# Patient Record
Sex: FEMALE | HISPANIC OR LATINO | Employment: FULL TIME | URBAN - METROPOLITAN AREA
[De-identification: names, ages, dates, MRNs, and addresses within clinical notes are randomized per-mention and may not be internally consistent; named-entity substitution may affect disease eponyms.]

---

## 2023-01-11 ENCOUNTER — OFFICE VISIT (OUTPATIENT)
Dept: OBSTETRICS AND GYNECOLOGY | Facility: CLINIC | Age: 23
End: 2023-01-11

## 2023-01-11 VITALS
HEIGHT: 64 IN | DIASTOLIC BLOOD PRESSURE: 64 MMHG | WEIGHT: 128 LBS | SYSTOLIC BLOOD PRESSURE: 108 MMHG | BODY MASS INDEX: 21.85 KG/M2

## 2023-01-11 DIAGNOSIS — R10.2 PELVIC PAIN: ICD-10-CM

## 2023-01-11 DIAGNOSIS — N93.9 ABNORMAL UTERINE BLEEDING (AUB): ICD-10-CM

## 2023-01-11 DIAGNOSIS — Z78.9 USES SPANISH AS PRIMARY SPOKEN LANGUAGE: ICD-10-CM

## 2023-01-11 DIAGNOSIS — Z30.9 ENCOUNTER FOR CONTRACEPTIVE MANAGEMENT, UNSPECIFIED TYPE: Primary | ICD-10-CM

## 2023-01-11 LAB
B-HCG UR QL: NEGATIVE
BILIRUB BLD-MCNC: NEGATIVE MG/DL
CLARITY, POC: CLEAR
COLOR UR: YELLOW
EXPIRATION DATE: NORMAL
GLUCOSE UR STRIP-MCNC: NEGATIVE MG/DL
INTERNAL NEGATIVE CONTROL: NEGATIVE
INTERNAL POSITIVE CONTROL: POSITIVE
KETONES UR QL: NEGATIVE
LEUKOCYTE EST, POC: NEGATIVE
Lab: 5
NITRITE UR-MCNC: NEGATIVE MG/ML
PH UR: 5 [PH] (ref 5–8)
PROT UR STRIP-MCNC: NEGATIVE MG/DL
RBC # UR STRIP: ABNORMAL /UL
SP GR UR: 1 (ref 1–1.03)
UROBILINOGEN UR QL: NORMAL

## 2023-01-11 PROCEDURE — 81025 URINE PREGNANCY TEST: CPT | Performed by: STUDENT IN AN ORGANIZED HEALTH CARE EDUCATION/TRAINING PROGRAM

## 2023-01-11 PROCEDURE — 81002 URINALYSIS NONAUTO W/O SCOPE: CPT | Performed by: STUDENT IN AN ORGANIZED HEALTH CARE EDUCATION/TRAINING PROGRAM

## 2023-01-11 PROCEDURE — 99203 OFFICE O/P NEW LOW 30 MIN: CPT | Performed by: STUDENT IN AN ORGANIZED HEALTH CARE EDUCATION/TRAINING PROGRAM

## 2023-01-11 NOTE — PROGRESS NOTES
21 yo  here today for HVB and irregular cycles. Menarche 14yo. Has always been irregular cycles every 2-3 months. Last three months with painful HVB.       PMH: History reviewed. No pertinent past medical history.             PSH:   History reviewed. No pertinent surgical history.         POB hx: G0  PGYN hx:  STD Denies  Pap < 20 yo ( Abnormal, LEEP, CKC)  Contraception None    Menarche See above        Social hx:   Social History     Socioeconomic History   • Marital status: Single   Tobacco Use   • Smoking status: Never     Passive exposure: Never   • Smokeless tobacco: Never   Vaping Use   • Vaping Use: Never used   Substance and Sexual Activity   • Alcohol use: Never   • Drug use: Never   • Sexual activity: Yes        [  X ] no h/o abuse/DV:         [ X  ] No ETOH, tobacco, drugs  [   ] Tobacco use   [   ] Alcohol use  [   ] Drug use     Significant family hx:   Denies                 [ X  ] No birth defects, stillbirth           Allergies:     No Known Allergies               Meds: No current outpatient medications on file.     Review of Systems   + dysmenorrhea   + irregular cycles     Vitals:    23 1040   BP: 108/64        OBGyn Exam     Vitals: VSS; AF    General Appearance:  Awake. Alert. Well developed. Well nourished. In no acute distress.      Lungs:  ° Clear to auscultation bilaterally without wheezes, rales or rhonchi.  Cardiovascular:  ° System: RRR, no murmur, consistent with normal pregnancy.  Abdomen:  Visual Inspection: ° Abdomen was normal on visual inspection.  Palpation: ° Abdomen was soft. ° Abdominal non-tender.  Genitalia:  External: ° Vulva was normal. ° Labia showed no abnormalities. ° Clitoris was normal. ° [place]'s gland was normal. ° Bartholin's gland was normal. ° Genitalia exhibited no lesion.  Vagina: ° Mucosa was normal. ° Not tender. ° No vaginal mass was observed.  Cervix: ° No cervical discharge. ° Not tender.  Uterus: ° Fundal height was normal for gestational age.  ° Not tender.  Uterine Adnexae: ° Normal without masses or tenderness.  Neurological:  ° Oriented to time, place, and person.  Skin:  ° General appearance was normal. No bruising or ecchymosis.    Diagnoses and all orders for this visit:    1. Encounter for contraceptive management, unspecified type (Primary)  -     POC Urinalysis Dipstick  -     POC Pregnancy, Urine  -     Chlamydia trachomatis, Neisseria gonorrhoeae, Trichomonas vaginalis, PCR - Urine, Urine, Random Void  -     TSH  -     CBC & Differential  -     Testosterone Free Direct  -     Genital Mycoplasmas SALONI, Swab - Swab, Vagina  -     NuSwab BV & Candida - Swab, Vagina     We discussed the etiologies of abnormal bleeding including polyps, fibroids, adenomyosis, malignancy, coagulopathy, ovulatory, idiopathic, endometrial and other.  Will order a TSH, CBC, Hcg  and pelvic ultrasound.  We discussed treatment options for AUB including Motrin, hormonal birth control including the pill, patch, Nuvaring, Depo Provera,   and the Mirena IUD    Concern for possible PCOS  US and Testosterone also ordered   If + or neg for PCOS would use CHC possibly in continuous manner to assist with dysmenorrhea  Will also culture urine to assess for UTI      I spent 30 minutes caring for Samia on this date of service. This time includes time spent by me in the following activities: preparing for the visit, reviewing tests, obtaining and/or reviewing a separately obtained history, performing a medically appropriate examination and/or evaluation, counseling and educating the patient/family/caregiver, ordering medications, tests, or procedures, documenting information in the medical record, independently interpreting results and communicating that information with the patient/family/caregiver and care coordination     Jefferson Torrez DO  01/11/2023    10:55 EST

## 2023-01-13 LAB
BASOPHILS # BLD AUTO: 0.05 10*3/MM3 (ref 0–0.2)
BASOPHILS NFR BLD AUTO: 0.6 % (ref 0–1.5)
C TRACH RRNA SPEC QL NAA+PROBE: NEGATIVE
EOSINOPHIL # BLD AUTO: 0.41 10*3/MM3 (ref 0–0.4)
EOSINOPHIL NFR BLD AUTO: 5 % (ref 0.3–6.2)
ERYTHROCYTE [DISTWIDTH] IN BLOOD BY AUTOMATED COUNT: 13.1 % (ref 12.3–15.4)
HCT VFR BLD AUTO: 39.5 % (ref 34–46.6)
HGB BLD-MCNC: 13.2 G/DL (ref 12–15.9)
IMM GRANULOCYTES # BLD AUTO: 0.02 10*3/MM3 (ref 0–0.05)
IMM GRANULOCYTES NFR BLD AUTO: 0.2 % (ref 0–0.5)
LYMPHOCYTES # BLD AUTO: 2.04 10*3/MM3 (ref 0.7–3.1)
LYMPHOCYTES NFR BLD AUTO: 25.1 % (ref 19.6–45.3)
MCH RBC QN AUTO: 30.1 PG (ref 26.6–33)
MCHC RBC AUTO-ENTMCNC: 33.4 G/DL (ref 31.5–35.7)
MCV RBC AUTO: 90.2 FL (ref 79–97)
MONOCYTES # BLD AUTO: 0.41 10*3/MM3 (ref 0.1–0.9)
MONOCYTES NFR BLD AUTO: 5 % (ref 5–12)
N GONORRHOEA RRNA SPEC QL NAA+PROBE: NEGATIVE
NEUTROPHILS # BLD AUTO: 5.19 10*3/MM3 (ref 1.7–7)
NEUTROPHILS NFR BLD AUTO: 64.1 % (ref 42.7–76)
NRBC BLD AUTO-RTO: 0 /100 WBC (ref 0–0.2)
PLATELET # BLD AUTO: 354 10*3/MM3 (ref 140–450)
RBC # BLD AUTO: 4.38 10*6/MM3 (ref 3.77–5.28)
T VAGINALIS RRNA SPEC QL NAA+PROBE: NEGATIVE
TESTOST FREE SERPL-MCNC: 0.7 PG/ML (ref 0–4.2)
TSH SERPL DL<=0.005 MIU/L-ACNC: 0.98 UIU/ML (ref 0.27–4.2)
WBC # BLD AUTO: 8.12 10*3/MM3 (ref 3.4–10.8)

## 2023-01-16 ENCOUNTER — TELEPHONE (OUTPATIENT)
Dept: OBSTETRICS AND GYNECOLOGY | Facility: CLINIC | Age: 23
End: 2023-01-16

## 2023-01-16 LAB
A VAGINAE DNA VAG QL NAA+PROBE: ABNORMAL SCORE
BVAB2 DNA VAG QL NAA+PROBE: ABNORMAL SCORE
C ALBICANS DNA VAG QL NAA+PROBE: NEGATIVE
C GLABRATA DNA VAG QL NAA+PROBE: NEGATIVE
M GENITALIUM DNA SPEC QL NAA+PROBE: NEGATIVE
M HOMINIS DNA SPEC QL NAA+PROBE: POSITIVE
MEGA1 DNA VAG QL NAA+PROBE: ABNORMAL SCORE
UREAPLASMA DNA SPEC QL NAA+PROBE: POSITIVE

## 2023-01-16 RX ORDER — METRONIDAZOLE 500 MG/1
500 TABLET ORAL 2 TIMES DAILY
Qty: 14 TABLET | Refills: 0 | Status: SHIPPED | OUTPATIENT
Start: 2023-01-16 | End: 2023-01-23

## 2023-01-16 RX ORDER — DOXYCYCLINE HYCLATE 100 MG
100 TABLET ORAL 2 TIMES DAILY
Qty: 14 TABLET | Refills: 0 | Status: SHIPPED | OUTPATIENT
Start: 2023-01-16 | End: 2023-01-23

## 2023-01-16 NOTE — TELEPHONE ENCOUNTER
Relationship:     Best call back number: [phone]    What is the best time to reach you: ANYTIME IS GOOD; WILL HELP WITH TRANSLATING    Who are you requesting to speak with (clinical staff, provider,  specific staff member): HONEY    What was the call regarding: LAB RESULTS; ATTEMPTED TO WARM TRANSFER BUT NO ANSWER    Do you require a callback: YES

## 2023-01-16 NOTE — PROGRESS NOTES
Vaginal swabs positive for bacterial vaginosis and mycoplasma/ureaplasma . Antibiotics ordered. Please take until all completed

## 2023-01-24 ENCOUNTER — OFFICE VISIT (OUTPATIENT)
Dept: OBSTETRICS AND GYNECOLOGY | Facility: CLINIC | Age: 23
End: 2023-01-24
Payer: MEDICAID

## 2023-01-24 VITALS
BODY MASS INDEX: 21.51 KG/M2 | DIASTOLIC BLOOD PRESSURE: 62 MMHG | SYSTOLIC BLOOD PRESSURE: 108 MMHG | WEIGHT: 126 LBS | HEIGHT: 64 IN

## 2023-01-24 DIAGNOSIS — N80.9 ENDOMETRIOSIS: Primary | ICD-10-CM

## 2023-01-24 DIAGNOSIS — R10.2 PELVIC PAIN: ICD-10-CM

## 2023-01-24 PROCEDURE — 99213 OFFICE O/P EST LOW 20 MIN: CPT | Performed by: STUDENT IN AN ORGANIZED HEALTH CARE EDUCATION/TRAINING PROGRAM

## 2023-01-24 RX ORDER — NAPROXEN SODIUM 550 MG/1
550 TABLET ORAL 2 TIMES DAILY WITH MEALS
Qty: 60 TABLET | Refills: 11 | Status: SHIPPED | OUTPATIENT
Start: 2023-01-24 | End: 2024-01-24

## 2023-01-24 RX ORDER — NORETHINDRONE ACETATE AND ETHINYL ESTRADIOL 1; .02 MG/1; MG/1
1 TABLET ORAL DAILY
Qty: 21 TABLET | Refills: 12 | Status: SHIPPED | OUTPATIENT
Start: 2023-01-24 | End: 2024-01-24

## 2023-01-24 NOTE — PROGRESS NOTES
Here today for pelvic pain and f/u from labs. Still having dysmenorrhea and dyspareunia     Initial Appt: 23 yo  here today for HVB and irregular cycles. Menarche 14yo. Has always been irregular cycles every 2-3 months. Last three months with painful HVB.       PMH: History reviewed. No pertinent past medical history.             PSH:   History reviewed. No pertinent surgical history.         POB hx: G0  PGYN hx:  STD Denies  Pap < 24 yo ( Abnormal, LEEP, CKC)  Contraception None    Menarche See above        Social hx:   Social History     Socioeconomic History   • Marital status: Single   Tobacco Use   • Smoking status: Never     Passive exposure: Never   • Smokeless tobacco: Never   Vaping Use   • Vaping Use: Never used   Substance and Sexual Activity   • Alcohol use: Never   • Drug use: Never   • Sexual activity: Yes        [  X ] no h/o abuse/DV:         [ X  ] No ETOH, tobacco, drugs  [   ] Tobacco use   [   ] Alcohol use  [   ] Drug use     Significant family hx:   Denies                 [ X  ] No birth defects, stillbirth           Allergies:     No Known Allergies               Meds:   Current Outpatient Medications:   •  naproxen sodium (Anaprox DS) 550 MG tablet, Take 1 tablet by mouth 2 (Two) Times a Day With Meals. Take during cycles for pain, Disp: 60 tablet, Rfl: 11  •  norethindrone-ethinyl estradiol (Loestrin , ,) 1-20 MG-MCG per tablet, Take 1 tablet by mouth Daily. Take continuously ( 21 days and do not take Placebo), Disp: 21 tablet, Rfl: 12     Review of Systems   + dysmenorrhea   + irregular cycles     Vitals:    23 1121   BP: 108/62        OBGyn Exam     Vitals: VSS; AF    General Appearance:  Awake. Alert. Well developed. Well nourished. In no acute distress.      Lungs:  ° Clear to auscultation bilaterally without wheezes, rales or rhonchi.  Cardiovascular:  ° System: RRR, no murmur  Abdomen:  Visual Inspection: ° Abdomen was normal on visual inspection.  Palpation: °  Abdomen was soft. ° Abdominal non-tender.  Genitalia:  Defer   Neurological:  ° Oriented to time, place, and person.  Skin:  ° General appearance was normal. No bruising or ecchymosis.    US   Uterus - 6.3x4.09x 3.3 cm   Volume - 45 cm^3  EL - 0.44 cm   Fibroids/polyp None seen   Ovaries R Ovary appears WNL   Left Ovary Simple cyst 1.8x 1.2cm     A/P   Endometriosis vs pelvic pain   Completed Oral Abx     Reviewed the various etiologies of pelvic pain  to include uterine fibroids,  polyps, endometriosis, ovarian cysts, adhesions, infections, hernia, IBS, musculoskeletal causes, urologic causes (interstitial cystitis), and psychological causes.  I discussed conservative mgmt with  NSAIDS. Feel she has endometriosis on H&P.   Vaginal swab to assess for BV, Myco/ureaplasma next appt     Will try continuous CHC. If after 3 months no benefit consider Gnrh antagonist vs Lupron.   I also discussed diagnostic laparoscopy  with lysis of adhesions and quoted a 50% chance of not detecting any pathology at time of Laparoscopy and the possibility that LASHON would not improve her pain.      I spent 30 minutes caring for Samia on this date of service. This time includes time spent by me in the following activities: preparing for the visit, reviewing tests, obtaining and/or reviewing a separately obtained history, performing a medically appropriate examination and/or evaluation, counseling and educating the patient/family/caregiver, ordering medications, tests, or procedures, documenting information in the medical record, independently interpreting results and communicating that information with the patient/family/caregiver and care coordination     Jefferson Torrez DO  [id number]    11:54 EST

## 2023-04-11 RX ORDER — NORETHINDRONE ACETATE AND ETHINYL ESTRADIOL 1; .02 MG/1; MG/1
1 TABLET ORAL DAILY
Qty: 21 TABLET | Refills: 12 | Status: SHIPPED | OUTPATIENT
Start: 2023-04-11 | End: 2024-04-10

## 2023-05-02 ENCOUNTER — OFFICE VISIT (OUTPATIENT)
Dept: OBSTETRICS AND GYNECOLOGY | Facility: CLINIC | Age: 23
End: 2023-05-02

## 2023-05-02 VITALS
SYSTOLIC BLOOD PRESSURE: 118 MMHG | WEIGHT: 109.8 LBS | HEIGHT: 64 IN | BODY MASS INDEX: 18.74 KG/M2 | DIASTOLIC BLOOD PRESSURE: 80 MMHG

## 2023-05-02 DIAGNOSIS — N80.9 ENDOMETRIOSIS: Primary | ICD-10-CM

## 2023-05-02 RX ORDER — NORETHINDRONE ACETATE AND ETHINYL ESTRADIOL 1; .02 MG/1; MG/1
1 TABLET ORAL DAILY
Qty: 21 TABLET | Refills: 12 | Status: SHIPPED | OUTPATIENT
Start: 2023-05-02 | End: 2024-05-01

## 2023-05-02 NOTE — PROGRESS NOTES
Pain is mostly resolved on continuous CHC. Only other concern is bill for pain Tx. Asks can we help.     Initial Appt: 23 yo  here today for HVB and irregular cycles. Menarche 14yo. Has always been irregular cycles every 2-3 months. Last three months with painful HVB.       PMH: History reviewed. No pertinent past medical history.             PSH:   History reviewed. No pertinent surgical history.         POB hx: G0  PGYN hx:  STD Denies  Pap < 22 yo ( Abnormal, LEEP, CKC)  Contraception None    Menarche See above        Social hx:   Social History     Socioeconomic History   • Marital status: Single   Tobacco Use   • Smoking status: Never     Passive exposure: Never   • Smokeless tobacco: Never   Vaping Use   • Vaping Use: Never used   Substance and Sexual Activity   • Alcohol use: Never   • Drug use: Never   • Sexual activity: Yes        [  X ] no h/o abuse/DV:         [ X  ] No ETOH, tobacco, drugs  [   ] Tobacco use   [   ] Alcohol use  [   ] Drug use     Significant family hx:   Denies                 [ X  ] No birth defects, stillbirth           Allergies:     No Known Allergies               Meds:   Current Outpatient Medications:   •  naproxen sodium (Anaprox DS) 550 MG tablet, Take 1 tablet by mouth 2 (Two) Times a Day With Meals. Take during cycles for pain, Disp: 60 tablet, Rfl: 11  •  norethindrone-ethinyl estradiol (Loestrin , ,) 1-20 MG-MCG per tablet, Take 1 tablet by mouth Daily. Take continuously ( 21 days and do not take Placebo), Disp: 21 tablet, Rfl: 12     Review of Systems   Negative today     Vitals:    23 1411   BP: 118/80        OBGyn Exam     Vitals: VSS; AF    General Appearance:  Awake. Alert. Well developed. Well nourished. In no acute distress.        Abdomen:  Visual Inspection: ° Abdomen was normal on visual inspection.  Palpation: ° Abdomen was soft. ° Abdominal non-tender.  Genitalia:  Defer   Neurological:  ° Oriented to time, place, and person.  Skin:  ° General  appearance was normal. No bruising or ecchymosis.        A/P   Endometriosis       Again we reviewed the various etiologies of pelvic pain  to include uterine fibroids,  polyps, endometriosis, ovarian cysts, adhesions, infections, hernia, IBS, musculoskeletal causes, urologic causes (interstitial cystitis), and psychological causes.      Will continue continuous CHC.   Have her bring bill tomorrow and we will ask if we can asisst with the pt.      I spent 30 minutes caring for Samia on this date of service. This time includes time spent by me in the following activities: preparing for the visit, reviewing tests, obtaining and/or reviewing a separately obtained history, performing a medically appropriate examination and/or evaluation, counseling and educating the patient/family/caregiver, ordering medications, tests, or procedures, documenting information in the medical record, independently interpreting results and communicating that information with the patient/family/caregiver and care coordination     Jefferson Torrez DO  2May23    14:21 EDT

## 2023-11-14 ENCOUNTER — OFFICE VISIT (OUTPATIENT)
Dept: OBSTETRICS AND GYNECOLOGY | Facility: CLINIC | Age: 23
End: 2023-11-14

## 2023-11-14 VITALS
BODY MASS INDEX: 20.18 KG/M2 | DIASTOLIC BLOOD PRESSURE: 72 MMHG | HEIGHT: 64 IN | SYSTOLIC BLOOD PRESSURE: 104 MMHG | WEIGHT: 118.2 LBS

## 2023-11-14 DIAGNOSIS — Z78.9 USES SPANISH AS PRIMARY SPOKEN LANGUAGE: ICD-10-CM

## 2023-11-14 DIAGNOSIS — Z01.419 ROUTINE GYNECOLOGICAL EXAMINATION: ICD-10-CM

## 2023-11-14 DIAGNOSIS — N80.9 ENDOMETRIOSIS: ICD-10-CM

## 2023-11-14 DIAGNOSIS — Z01.419 PAP SMEAR, AS PART OF ROUTINE GYNECOLOGICAL EXAMINATION: Primary | ICD-10-CM

## 2023-11-14 RX ORDER — NORETHINDRONE ACETATE AND ETHINYL ESTRADIOL 1; .02 MG/1; MG/1
1 TABLET ORAL DAILY
Qty: 21 TABLET | Refills: 12 | Status: SHIPPED | OUTPATIENT
Start: 2023-11-14 | End: 2024-11-13

## 2023-11-14 NOTE — PROGRESS NOTES
GYN Annual Exam     CC- Here for annual exam.     Samia Shrestha is a 23 y.o. female established patient who presents for annual well woman exam. Periods are regular every 28-30 days, lasting several days.     Her continuous CHC is working well for probable endometriosis     OB History          0    Para   0    Term   0       0    AB   0    Living   0         SAB   0    IAB   0    Ectopic   0    Molar   0    Multiple   0    Live Births   0                Menarche: 14yo   Current contraception: OCP (estrogen/progesterone)  History of abnormal Pap smear: no  History of abnormal mammogram: no  Family history of uterine, colon or ovarian cancer: no  Family history of breast cancer: no  H/o STDs: Denies  Last pap:First screen today   Gardasil:uncertain if she received the vaccine  PETTY: none    Health Maintenance   Topic Date Due    Annual Gynecologic Pelvic and Breast Exam  Never done    COVID-19 Vaccine (1) Never done    HPV VACCINES (1 - 2-dose series) Never done    TDAP/TD VACCINES (1 - Tdap) Never done    HEPATITIS C SCREENING  Never done    ANNUAL PHYSICAL  Never done    PAP SMEAR  Never done    INFLUENZA VACCINE  Never done    CHLAMYDIA SCREENING  2024    Pneumococcal Vaccine 0-64  Aged Out    MENINGOCOCCAL VACCINE  Aged Out       No past medical history on file.    No past surgical history on file.      Current Outpatient Medications:     norethindrone-ethinyl estradiol (Loestrin , ,) 1-20 MG-MCG per tablet, Take 1 tablet by mouth Daily. Take continuously ( 21 days and do not take Placebo), Disp: 21 tablet, Rfl: 12    naproxen sodium (Anaprox DS) 550 MG tablet, Take 1 tablet by mouth 2 (Two) Times a Day With Meals. Take during cycles for pain, Disp: 60 tablet, Rfl: 11    No Known Allergies    Social History     Tobacco Use    Smoking status: Never     Passive exposure: Never    Smokeless tobacco: Never   Vaping Use    Vaping Use: Never used   Substance Use Topics    Alcohol use: Never  "   Drug use: Never       No family history on file.    Review of Systems  Neg     /72   Ht 162.6 cm (64\")   Wt 53.6 kg (118 lb 3.2 oz)   LMP 11/13/2023 (Exact Date)   BMI 20.29 kg/m²     Physical Exam  Exam conducted with a chaperone present.   Constitutional:       Appearance: Normal appearance.   Cardiovascular:      Rate and Rhythm: Normal rate.   Pulmonary:      Effort: Pulmonary effort is normal.      Breath sounds: Normal breath sounds.   Abdominal:      General: Abdomen is flat.      Palpations: Abdomen is soft.   Genitourinary:     General: Normal vulva.      Exam position: Lithotomy position.      Vagina: Normal.      Cervix: Normal.      Uterus: Normal.       Adnexa: Right adnexa normal and left adnexa normal.      Comments: On her cycle   Neurological:      General: No focal deficit present.      Mental Status: She is alert and oriented to person, place, and time.   Psychiatric:         Mood and Affect: Mood normal.         Behavior: Behavior normal.              Assessment/Plan    1) GYN HM: pap/G/C/T  SBE demonstrated and encouraged.  2) STD screening: declines Condoms encouraged.  3) Contraception: OCP (estrogen/progesterone)  4) Family Planning: family planning: no plans at present, encourage folic acid daily  5) Diet and Exercise discussed  6) Smoking Status: No  7) Social: Working At [employer] From [facility]   8) MMG- plan age 42  9)Follow up prn or 1 year       Diagnoses and all orders for this visit:    1. Pap smear, as part of routine gynecological examination (Primary)  -     IGP,CtNgTv,rfx Aptima HPV ASCU    2. Routine gynecological examination  -     Cancel: POC Urinalysis Dipstick  -     Cancel: POC Pregnancy, Urine  -     IGP,CtNgTv,rfx Aptima HPV ASCU    3. Uses Thai as primary spoken language    4. Endometriosis    Other orders  -     norethindrone-ethinyl estradiol (Loestrin 1/20, 21,) 1-20 MG-MCG per tablet; Take 1 tablet by mouth Daily. Take continuously ( 21 days and do " not take Placebo)  Dispense: 21 tablet; Refill: 12      I spent 10 minutes on the separately reported service of Endometriosis; Continuous CHC working well . This time is not included in the time used to support the E/M service also reported today.    Jefferson Torrez DO  11/14/2023    11:12 EST

## 2023-11-17 LAB
C TRACH RRNA CVX QL NAA+PROBE: NEGATIVE
CONV .: NORMAL
CYTOLOGIST CVX/VAG CYTO: NORMAL
CYTOLOGY CVX/VAG DOC CYTO: NORMAL
CYTOLOGY CVX/VAG DOC THIN PREP: NORMAL
DX ICD CODE: NORMAL
HIV 1 & 2 AB SER-IMP: NORMAL
N GONORRHOEA RRNA CVX QL NAA+PROBE: NEGATIVE
OTHER STN SPEC: NORMAL
STAT OF ADQ CVX/VAG CYTO-IMP: NORMAL
T VAGINALIS RRNA SPEC QL NAA+PROBE: NEGATIVE

## 2024-07-10 LAB
BACTERIA SPEC AEROBE CULT: NO GROWTH
C TRACH RRNA SPEC DONR QL NAA+PROBE: NEGATIVE
EXTERNAL ABO GROUPING: NORMAL
EXTERNAL ANTIBODY SCREEN: NORMAL
EXTERNAL HEMATOCRIT: 38 %
EXTERNAL HEMOGLOBIN: 12.6 G/DL
EXTERNAL HEPATITIS B SURFACE ANTIGEN: NEGATIVE
EXTERNAL NIPT: NEGATIVE
EXTERNAL PLATELET COUNT: 310 K/ΜL
EXTERNAL RH FACTOR: POSITIVE
EXTERNAL SYPHILIS RPR SCREEN: NORMAL
HIV 1+2 AB+HIV1 P24 AG SERPL QL IA: NEGATIVE
N GONORRHOEA DNA SPEC QL NAA+PROBE: NEGATIVE
RUBV IGG SERPL IA-ACNC: 2.7
VZV IGG SER QL: 330.5

## 2024-10-07 ENCOUNTER — TELEPHONE (OUTPATIENT)
Dept: OBSTETRICS AND GYNECOLOGY | Facility: CLINIC | Age: 24
End: 2024-10-07

## 2024-10-07 NOTE — TELEPHONE ENCOUNTER
Caller: Samia Shrestha    Relationship to patient: Self    Best call back number: [phone]    Patient is needing: EST PT OF THE OFFICE. LAST SEEN 11/14/2023 FOR ANNUAL EXAM. FRIEND VALERY DAVIS IS CALLING ON BEHALF OF PT DUE TO  NEED.  PT FRIEND CALLED TODAY FOR OB TRANSFER OF CARE. PT LMP IS AROUND 3/13/24. PT DUE IN DECEMBER. PT WAS SEEN AT [facility] AT [address] [place] [address] ONE TIME ON 7/10/24 FOR PREGNANCY CARE. SHE IS UNABLE TO CONTACT OFFICE DUE TO THEM NOT ANSWERING THE PHONE. SHE HAS HER ONE MEDICAL RECORD THROUGH [facility]. PT WAS APPROVED FOR [place] MEDICAID LAST WEEK. SHE WAS TOLD SHE NEEDED INSURANCE TO BE SEEN BY [facility] OFFICE. THAT'S WHY SHE SCHEDULED WITH ANOTHER CLINIC. PT CALLING TO TRANSFER OB CARE TO OFFICE. PT NEEDS  FOR CALL BACK. NUMBER VERIFIED IN CHART BELONGS TO PATIENT.

## 2024-10-28 ENCOUNTER — INITIAL PRENATAL (OUTPATIENT)
Dept: OBSTETRICS AND GYNECOLOGY | Facility: CLINIC | Age: 24
End: 2024-10-28
Payer: MEDICAID

## 2024-10-28 VITALS — WEIGHT: 154.5 LBS | SYSTOLIC BLOOD PRESSURE: 114 MMHG | DIASTOLIC BLOOD PRESSURE: 66 MMHG | BODY MASS INDEX: 26.52 KG/M2

## 2024-10-28 DIAGNOSIS — O09.33 LATE PRENATAL CARE AFFECTING PREGNANCY IN THIRD TRIMESTER: ICD-10-CM

## 2024-10-28 DIAGNOSIS — Z78.9 USES SPANISH AS PRIMARY SPOKEN LANGUAGE: ICD-10-CM

## 2024-10-28 DIAGNOSIS — Z34.00 INITIAL OBSTETRIC VISIT, ANTEPARTUM: Primary | ICD-10-CM

## 2024-10-28 DIAGNOSIS — Z23 NEED FOR INFLUENZA VACCINATION: ICD-10-CM

## 2024-10-28 DIAGNOSIS — Z23 NEED FOR TDAP VACCINATION: ICD-10-CM

## 2024-10-28 DIAGNOSIS — Z36.9 ENCOUNTER FOR ANTENATAL SCREENING, UNSPECIFIED: ICD-10-CM

## 2024-10-28 LAB
BILIRUB BLD-MCNC: NEGATIVE MG/DL
CLARITY, POC: CLEAR
COLOR UR: YELLOW
GLUCOSE UR STRIP-MCNC: NEGATIVE MG/DL
KETONES UR QL: ABNORMAL
LEUKOCYTE EST, POC: NEGATIVE
NITRITE UR-MCNC: NEGATIVE MG/ML
PH UR: 6 [PH] (ref 5–8)
PROT UR STRIP-MCNC: ABNORMAL MG/DL
RBC # UR STRIP: NEGATIVE /UL
SP GR UR: 1 (ref 1–1.03)
UROBILINOGEN UR QL: NORMAL

## 2024-10-28 NOTE — PROGRESS NOTES
Initial OB Visit     Chief Complaint   Patient presents with    Initial Prenatal Visit       Samia Shrestha is being seen today for her first obstetrical visit.  She is a 24 y.o.    32w5d gestation. This problem is new to me: yes      OB History          1    Para   0    Term   0       0    AB   0    Living   0         SAB   0    IAB   0    Ectopic   0    Molar   0    Multiple   0    Live Births   0              Seen at [facility] once; prenatal labs drawn    LNMP: 3/13/2024  Confident with date: Yes  Taking prenatal vitamins: Yes  Planned pregnancy: No; was on birth control pill  Prior obstetric issues, potential pregnancy concerns: G1  Family history of genetic issues (includes FOB): no  Prior infections concerning in pregnancy (Rash, fever in last 2 weeks): N/A  Varicella Hx: yes  Flu vaccine: no; desires today  COVID Vaccine: no   History of STDs: no  Current medications: no  Last pap smear: 2023- NIL  Smoker: No  Drug or alcohol abuse: No  H/O Physical, mental or sexual abuse: no  H/O mental health disorder: no  Prior testing for Cystic Fibrosis Carrier or Sickle Cell Trait- no  Prepregnancy BMI: Body mass index is 26.52 kg/m².      History reviewed. No pertinent past medical history.    History reviewed. No pertinent surgical history.      Current Outpatient Medications:     Prenatal Vit-Fe Fumarate-FA (PRENATAL PO), Take  by mouth., Disp: , Rfl:     No Known Allergies    Social History     Socioeconomic History    Marital status:    Tobacco Use    Smoking status: Never     Passive exposure: Never    Smokeless tobacco: Never   Vaping Use    Vaping status: Never Used   Substance and Sexual Activity    Alcohol use: Never    Drug use: Never    Sexual activity: Yes     Partners: Male     Birth control/protection: Birth control pill       History reviewed. No pertinent family history.    Review of systems     All systems reviewed and are negative      Objective    /66   Wt  70.1 kg (154 lb 8 oz)   LMP 03/13/2024   BMI 26.52 kg/m²     General Appearance:    Alert, cooperative, in no acute distress, habitus normal   Head:    Normocephalic, without obvious abnormality, atraumatic   Neck:   supple   Breast Exam:    deferred   Abdomen:     Deferred   Genitalia:    Deferred   Extremities:   Moves all extremities well, no edema, no cyanosis, no     redness   Skin:   No bleeding, bruising or rash   Neurologic:   Sensation intact, A&O times 3         Assessment/Plan    1) Pregnancy at 32w5d- US today: Anatomic survey WNL.   bpm. EFW 61% Sex-male. HAYDEE 15cm. CL=4.5cm. US findings discussed with pt/family. EDC established 12/18/2024 and confirmed by LMP/US.     2) OB exam: OB exam completed: Yes. New OB bag provided Yes. Pap collected: No; she is UTD. Provided list of safe medications to take while in pregnancy.    3) Labs: OB labs collected: Yes. Counseled on genetic carrier screening (CF/SMA/FX): Yes; she desires.  Counseled on NIPS: No, it was drawn previously. Counseled on AFP: No, she is too late for testing.    4) Body mass index is 26.52 kg/m². Overweight women, with a BMI of 25-29, should gain approx 15-25 pounds for the entire pregnancy.         5)  Prenatal care: Oriented to the office and prenatal care. Encourage prenatal vitamins. Disc Tylenol products are fine, avoid aspirin and ibuprofen; Zika (travel restrictions/ok to use insect repellant); not to change cat litter; food restrictions; exercise; avoidance of alcohol, tobacco, drugs and saunas/hot tubs.     6) S/p Covid vaccine: no; S/p Flu vaccine: no; received today    7) [place]uran-speaking-  used    8) Late PNC- missed AFP; needs 2 hr GTT/HH/RPR- will draw this Friday 11/1/24    9) Disc that all pregnant women should get a Tdap shot in the third trimester, preferably between 27 weeks and 36 weeks of pregnancy. The Tdap shot is an effective and safe way to protect the baby from serious illness and complications of  pertussis. Recommend that partners, family members, and infant caregivers should be up to date on theTdap vaccine if they have not previously been vaccinated. Ideally, all family members should be vaccinated at least 2 weeks before coming in contact with the . If not administered during pregnancy, the Tdap vaccine should be given immediately postpartum if the patient is not UTD on Tdap.   Received today      All questions answered.     I spent 35 minutes caring for Samia on this date of service. This time includes time spent by me in the following activities: preparing for the visit, reviewing tests, performing a medically appropriate examination and/or evaluation, counseling and educating the patient/family/caregiver, documenting information in the medical record, independently interpreting results and communicating that information with the patient/family/caregiver, and ordering test(s).  This time does not include time spent performing ultrasound.    RTO 2 weeks for OBT, RSV vaccine    Parts of this document have been copied or forwarded from her previous visits and have been reviewed, updated and edited as indicated.      Danika Griffin, APRN    10/29/2024  08:23 EDT

## 2024-10-29 LAB
BASOPHILS # BLD AUTO: 0 X10E3/UL (ref 0–0.2)
BASOPHILS NFR BLD AUTO: 0 %
EOSINOPHIL # BLD AUTO: 0.1 X10E3/UL (ref 0–0.4)
EOSINOPHIL NFR BLD AUTO: 1 %
ERYTHROCYTE [DISTWIDTH] IN BLOOD BY AUTOMATED COUNT: 12.9 % (ref 11.7–15.4)
HCT VFR BLD AUTO: 35.1 % (ref 34–46.6)
HGB BLD-MCNC: 11.7 G/DL (ref 11.1–15.9)
IMM GRANULOCYTES # BLD AUTO: 0.1 X10E3/UL (ref 0–0.1)
IMM GRANULOCYTES NFR BLD AUTO: 1 %
LYMPHOCYTES # BLD AUTO: 2 X10E3/UL (ref 0.7–3.1)
LYMPHOCYTES NFR BLD AUTO: 22 %
MCH RBC QN AUTO: 29.8 PG (ref 26.6–33)
MCHC RBC AUTO-ENTMCNC: 33.3 G/DL (ref 31.5–35.7)
MCV RBC AUTO: 90 FL (ref 79–97)
MONOCYTES # BLD AUTO: 0.8 X10E3/UL (ref 0.1–0.9)
MONOCYTES NFR BLD AUTO: 9 %
NEUTROPHILS # BLD AUTO: 6 X10E3/UL (ref 1.4–7)
NEUTROPHILS NFR BLD AUTO: 67 %
PLATELET # BLD AUTO: 404 X10E3/UL (ref 150–450)
RBC # BLD AUTO: 3.92 X10E6/UL (ref 3.77–5.28)
WBC # BLD AUTO: 8.9 X10E3/UL (ref 3.4–10.8)

## 2024-10-30 DIAGNOSIS — O99.019 MATERNAL ANEMIA IN PREGNANCY, ANTEPARTUM: Primary | ICD-10-CM

## 2024-10-30 LAB
AMPHETAMINES UR QL SCN: NEGATIVE NG/ML
BACTERIA UR CULT: NO GROWTH
BACTERIA UR CULT: NORMAL
BARBITURATES UR QL SCN: NEGATIVE NG/ML
BENZODIAZ UR QL SCN: NEGATIVE NG/ML
BUPRENORPHINE UR QL: NEGATIVE NG/ML
BZE UR QL SCN: NEGATIVE NG/ML
C TRACH RRNA SPEC QL NAA+PROBE: NEGATIVE
CANNABINOIDS UR QL SCN: NEGATIVE NG/ML
CREAT UR-MCNC: 209 MG/DL (ref 20–300)
FENTANYL UR-MCNC: NEGATIVE PG/ML
LABORATORY COMMENT REPORT: NORMAL
MEPERIDINE UR QL: NEGATIVE NG/ML
METHADONE UR QL SCN: NEGATIVE NG/ML
N GONORRHOEA RRNA SPEC QL NAA+PROBE: NEGATIVE
OPIATES UR QL SCN: NEGATIVE NG/ML
OXYCODONE+OXYMORPHONE UR QL SCN: NEGATIVE NG/ML
PCP UR QL: NEGATIVE NG/ML
PH UR: 6.5 [PH] (ref 4.5–8.9)
PROPOXYPH UR QL SCN: NEGATIVE NG/ML
T VAGINALIS RRNA SPEC QL NAA+PROBE: NEGATIVE
TRAMADOL UR QL SCN: NEGATIVE NG/ML

## 2024-10-30 RX ORDER — FERROUS GLUCONATE 324(38)MG
324 TABLET ORAL
Qty: 100 TABLET | Refills: 2 | Status: SHIPPED | OUTPATIENT
Start: 2024-10-30

## 2024-11-04 LAB
CITATION REF LAB TEST: NORMAL
CITATION REF LAB TEST: NORMAL
CLINICAL INFO: NORMAL
CLINICAL INFO: NORMAL
ETHNIC BACKGROUND STATED: NORMAL
ETHNIC BACKGROUND STATED: NORMAL
FMR1 GENE CGG RPT BLD/T QL: NORMAL
GENE DIS ANL CARRIER INTERP-IMP: NORMAL
GENE DIS ANL CARRIER INTERP-IMP: NORMAL
GENE MUT TESTED BLD/T: NORMAL
LAB DIRECTOR NAME PROVIDER: NORMAL
LAB DIRECTOR NAME PROVIDER: NORMAL
REASON FOR REFERRAL (NARRATIVE): NORMAL
REASON FOR REFERRAL (NARRATIVE): NORMAL
RECOMMENDATION PATIENT DOC-IMP: NORMAL
RECOMMENDATION PATIENT DOC-IMP: NORMAL
REF LAB TEST METHOD: NORMAL
REF LAB TEST METHOD: NORMAL
SERVICE CMNT-IMP: NORMAL
SERVICE CMNT-IMP: NORMAL
SMN1 GENE MUT ANL BLD/T: NORMAL
SPECIMEN SOURCE: NORMAL
SPECIMEN SOURCE: NORMAL

## 2024-11-11 ENCOUNTER — ROUTINE PRENATAL (OUTPATIENT)
Dept: OBSTETRICS AND GYNECOLOGY | Facility: CLINIC | Age: 24
End: 2024-11-11
Payer: MEDICAID

## 2024-11-11 VITALS — SYSTOLIC BLOOD PRESSURE: 110 MMHG | BODY MASS INDEX: 27.29 KG/M2 | WEIGHT: 159 LBS | DIASTOLIC BLOOD PRESSURE: 64 MMHG

## 2024-11-11 DIAGNOSIS — Z34.93 PRENATAL CARE IN THIRD TRIMESTER: Primary | ICD-10-CM

## 2024-11-11 DIAGNOSIS — Z36.9 ENCOUNTER FOR ANTENATAL SCREENING, UNSPECIFIED: ICD-10-CM

## 2024-11-11 LAB
BILIRUB BLD-MCNC: NEGATIVE MG/DL
CLARITY, POC: CLEAR
COLOR UR: YELLOW
GLUCOSE UR STRIP-MCNC: NEGATIVE MG/DL
KETONES UR QL: NEGATIVE
LEUKOCYTE EST, POC: NEGATIVE
NITRITE UR-MCNC: NEGATIVE MG/ML
PH UR: 6 [PH] (ref 5–8)
PROT UR STRIP-MCNC: NEGATIVE MG/DL
RBC # UR STRIP: NEGATIVE /UL
SP GR UR: 1.01 (ref 1–1.03)
UROBILINOGEN UR QL: NORMAL

## 2024-11-11 PROCEDURE — 99213 OFFICE O/P EST LOW 20 MIN: CPT | Performed by: NURSE PRACTITIONER

## 2024-11-11 NOTE — PROGRESS NOTES
OB follow up     CC:  Here for prenatal follow up    Samia Shrestha is a 24 y.o.  34w5d being seen today for her obstetrical visit.  Patient reports no complaints. Taking +PNV.     Review of Systems    Genitourinary: Neg for cramping, vaginal bleeding, SROM, or dysuria.       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 Missed AFP d/t late care. NIPS     3) Anemia- Taking iron. Check @ 36 weeks     4) Covid vaccine declined    5)  S/p Flu vaccine    6) S/P tDap vaccine    7) Japanese-speaking-  used    8) RSV vaccine- Uncertain. Info provided.    9) Swelling- Trace to 1+ in BLE. BP normal. Enc adequate H20, limit salt and keep legs elevated.     Plan    Continue prenatal vitamins   Reviewed this stage of pregnancy  Problem list updated   Follow up in 1 weeks for OB tummy and RSV vaccine?     Seema Tee, APRN     2024  09:52 EST

## 2024-11-14 LAB
CFTR MUT ANL BLD/T: NORMAL
CITATION REF LAB TEST: NORMAL
CLINICAL INFO: NORMAL
ETHNIC BACKGROUND STATED: NORMAL
GENE DIS ANL CARRIER INTERP-IMP: NORMAL
GENE MUT TESTED BLD/T: NORMAL
LAB DIRECTOR NAME PROVIDER: NORMAL
REASON FOR REFERRAL (NARRATIVE): NORMAL
RECOMMENDATION PATIENT DOC-IMP: NORMAL
REF LAB TEST METHOD: NORMAL
SERVICE CMNT-IMP: NORMAL
SPECIMEN SOURCE: NORMAL

## 2024-11-19 LAB
DEPRECATED FRAXE GENE CGG RPT BLD/T QL: NORMAL
NTRA CYSTIC FIBROSIS: NORMAL
NTRA SPINAL MUSCULAR ATROPHY: NORMAL

## 2024-11-20 ENCOUNTER — ROUTINE PRENATAL (OUTPATIENT)
Dept: OBSTETRICS AND GYNECOLOGY | Facility: CLINIC | Age: 24
End: 2024-11-20
Payer: MEDICAID

## 2024-11-20 VITALS — SYSTOLIC BLOOD PRESSURE: 110 MMHG | DIASTOLIC BLOOD PRESSURE: 62 MMHG | BODY MASS INDEX: 27.46 KG/M2 | WEIGHT: 160 LBS

## 2024-11-20 DIAGNOSIS — Z36.85 ANTENATAL SCREENING FOR STREPTOCOCCUS B: ICD-10-CM

## 2024-11-20 DIAGNOSIS — O99.019 MATERNAL ANEMIA IN PREGNANCY, ANTEPARTUM: ICD-10-CM

## 2024-11-20 DIAGNOSIS — Z3A.36 36 WEEKS GESTATION OF PREGNANCY: Primary | ICD-10-CM

## 2024-11-20 DIAGNOSIS — Z36.9 ENCOUNTER FOR ANTENATAL SCREENING, UNSPECIFIED: ICD-10-CM

## 2024-11-20 NOTE — PROGRESS NOTES
Chief Complaint   Patient presents with    Routine Prenatal Visit       HPI: 24 y.o.  at 36w0d presenting for an OB visit. Overall feeling well today.  She denies any fevers, chills, headaches, blurry vision, chest pain, shortness of breath abdominal pain, dysuria, or leg swelling.  She denies any vaginal bleeding, leakage of fluid, contractions, change in fetal movement, or increased vaginal pressure.    Relevant data reviewed:    Last OB US Data (since 2024)       None          Vitals:    24 1129   BP: 110/62   Weight: 72.6 kg (160 lb)     Total weight gain for pregnancy:  21.3 kg (47 lb)    Cx exam:   / /        Review of systems:   Gen: negative  CV:     negative  GI: negative  :   negative  MS:    negative  Neuro: negative  Pul: negative    Physical Exam  Constitutional:       General: She is not in acute distress.     Appearance: She is not ill-appearing.   Eyes:      Pupils: Pupils are equal, round, and reactive to light.   Pulmonary:      Effort: Pulmonary effort is normal. No respiratory distress.   Abdominal:      General: There is no distension.      Palpations: Abdomen is soft.      Tenderness: There is no abdominal tenderness.   Musculoskeletal:         General: Normal range of motion.   Neurological:      General: No focal deficit present.      Mental Status: She is alert and oriented to person, place, and time.   Psychiatric:         Mood and Affect: Mood normal.         Behavior: Behavior normal.         A/P  1. Intrauterine pregnancy at 36w0d   - GBS collected today  - Cervical exam attempted at patient request but unable to tolerate exam   2. Pregnancy Risk:  NORMAL    Diagnoses and all orders for this visit:    1. 36 weeks gestation of pregnancy (Primary)    2. Encounter for  screening, unspecified  -     Cancel: POC Urinalysis Dipstick    3.  screening for streptococcus B  -     Group B Streptococcus Culture - Swab, Vaginal/Rectum    4. Maternal anemia in  pregnancy, antepartum    Other orders  -     ABRYSVO RSV Vaccine (Adults 60+, pregnant women 32-36 wks)        Routine labor warnings were discussed and indications for L & D f/u including bleeding, regular contractions, decreased fetal movement or/and rupture of membranes.   -----------------------  PLAN:   Return in about 1 week (around 11/27/2024) for OB visit.    Wes Lindo MD  11/20/2024   12:21 EST

## 2024-11-24 LAB — B-HEM STREP SPEC QL CULT: NEGATIVE

## 2024-11-27 ENCOUNTER — ROUTINE PRENATAL (OUTPATIENT)
Dept: OBSTETRICS AND GYNECOLOGY | Facility: CLINIC | Age: 24
End: 2024-11-27
Payer: MEDICAID

## 2024-11-27 VITALS — SYSTOLIC BLOOD PRESSURE: 118 MMHG | DIASTOLIC BLOOD PRESSURE: 76 MMHG | WEIGHT: 162 LBS | BODY MASS INDEX: 27.81 KG/M2

## 2024-11-27 DIAGNOSIS — Z36.9 ENCOUNTER FOR ANTENATAL SCREENING, UNSPECIFIED: Primary | ICD-10-CM

## 2024-11-27 DIAGNOSIS — Z78.9 USES SPANISH AS PRIMARY SPOKEN LANGUAGE: ICD-10-CM

## 2024-11-27 DIAGNOSIS — O99.019 MATERNAL ANEMIA IN PREGNANCY, ANTEPARTUM: ICD-10-CM

## 2024-11-27 DIAGNOSIS — N80.9 ENDOMETRIOSIS: ICD-10-CM

## 2024-11-27 DIAGNOSIS — Z23 NEED FOR VACCINATION: ICD-10-CM

## 2024-11-27 DIAGNOSIS — Z3A.37 37 WEEKS GESTATION OF PREGNANCY: ICD-10-CM

## 2024-11-27 DIAGNOSIS — O26.849 FETAL SIZE INCONSISTENT WITH DATES: ICD-10-CM

## 2024-11-27 PROBLEM — N93.9 ABNORMAL UTERINE BLEEDING (AUB): Status: RESOLVED | Noted: 2023-01-11 | Resolved: 2024-11-27

## 2024-11-27 PROBLEM — Z30.9 ENCOUNTER FOR CONTRACEPTIVE MANAGEMENT: Status: RESOLVED | Noted: 2023-01-11 | Resolved: 2024-11-27

## 2024-11-27 PROBLEM — R10.2 PELVIC PAIN: Status: RESOLVED | Noted: 2023-01-11 | Resolved: 2024-11-27

## 2024-11-27 LAB
BILIRUB BLD-MCNC: NEGATIVE MG/DL
CLARITY, POC: CLEAR
COLOR UR: YELLOW
GLUCOSE UR STRIP-MCNC: NEGATIVE MG/DL
KETONES UR QL: NEGATIVE
LEUKOCYTE EST, POC: NEGATIVE
NITRITE UR-MCNC: NEGATIVE MG/ML
PH UR: 5 [PH] (ref 5–8)
PROT UR STRIP-MCNC: NEGATIVE MG/DL
RBC # UR STRIP: NEGATIVE /UL
SP GR UR: 1.03 (ref 1–1.03)
UROBILINOGEN UR QL: NORMAL

## 2024-11-27 NOTE — PROGRESS NOTES
Chief Complaint   Patient presents with    Routine Prenatal Visit       HPI: 24 y.o.  at 37w0d here for ob check and labor warnings.     Relevant data reviewed:    Vitals:    24 1018   BP: 118/76   Weight: 73.5 kg (162 lb)     Total weight gain for pregnancy:  22.2 kg (49 lb)    Cx exam:   / /        Review of systems:   Gen: negative  CV:     negative  GI: negative  :   negative  MS:    negative  Neuro: negative  Pul: negative    Physical Exam  Vitals and nursing note reviewed.   Constitutional:       Appearance: She is well-developed.   HENT:      Head: Normocephalic and atraumatic.   Cardiovascular:      Rate and Rhythm: Normal rate.   Pulmonary:      Effort: Pulmonary effort is normal.   Abdominal:      General: There is no distension.      Palpations: Abdomen is soft. There is no mass.      Tenderness: There is no abdominal tenderness. There is no guarding.   Genitourinary:     Vagina: No vaginal discharge.   Musculoskeletal:         General: No tenderness or deformity. Normal range of motion.      Cervical back: Normal range of motion.   Skin:     General: Skin is warm and dry.      Coloration: Skin is not pale.      Findings: No erythema or rash.   Neurological:      Mental Status: She is alert and oriented to person, place, and time.   Psychiatric:         Behavior: Behavior normal.         Thought Content: Thought content normal.         Judgment: Judgment normal.                 A/P  1. Intrauterine pregnancy at 37w0d   2. Pregnancy Risk:  NORMAL    Diagnoses and all orders for this visit:    1. Encounter for  screening, unspecified (Primary)  -     POC Urinalysis Dipstick    2. Endometriosis    3. Maternal anemia in pregnancy, antepartum    4. Uses [place] Islander as primary spoken language    5. 37 weeks gestation of pregnancy    6. Need for vaccination  Comments:  COVID:  current  FLU: 10/20/24  TDAP: 10/20/24  RSV: 24  Overview:  COVID:  ?  FLU: 10/20/24  TDAP: 10/20/24  RSV:  11/28/24      7. Fetal size inconsistent with dates: S>D  Comments:  scan for growth: ()        Routine labor warnings were discussed and indications for L & D f/u including bleeding, regular contractions, decreased fetal movement or/and rupture of membranes.   Practice OB call structure was discussed.   -----------------------  PLAN:   Return in about 1 week (around 12/4/2024) for ob int. & u/s for S>D    I spent 30+ minutes caring for Samia on this date of service. This time includes time spent by me in the following activities: preparing for the visit, reviewing tests, performing a medically appropriate examination and/or evaluation, counseling and educating the patient/family/caregiver, referring and communicating with other health care professionals, documenting information in the medical record, independently interpreting results and communicating that information with the patient/family/caregiver, care coordination, ordering medications, ordering test(s), ordering procedure(s), obtaining a separately obtained history, and reviewing a separately obtained history    Edward Morelos MD  11/27/2024 10:58 EST

## 2024-12-05 ENCOUNTER — ROUTINE PRENATAL (OUTPATIENT)
Dept: OBSTETRICS AND GYNECOLOGY | Facility: CLINIC | Age: 24
End: 2024-12-05
Payer: MEDICAID

## 2024-12-05 VITALS — DIASTOLIC BLOOD PRESSURE: 88 MMHG | SYSTOLIC BLOOD PRESSURE: 120 MMHG | BODY MASS INDEX: 28.84 KG/M2 | WEIGHT: 168 LBS

## 2024-12-05 DIAGNOSIS — Z3A.38 38 WEEKS GESTATION OF PREGNANCY: ICD-10-CM

## 2024-12-05 DIAGNOSIS — Z36.9 ENCOUNTER FOR ANTENATAL SCREENING, UNSPECIFIED: Primary | ICD-10-CM

## 2024-12-05 DIAGNOSIS — Z78.9 USES SPANISH AS PRIMARY SPOKEN LANGUAGE: ICD-10-CM

## 2024-12-05 DIAGNOSIS — O99.019 MATERNAL ANEMIA IN PREGNANCY, ANTEPARTUM: ICD-10-CM

## 2024-12-05 PROBLEM — N80.9 ENDOMETRIOSIS: Status: RESOLVED | Noted: 2023-01-24 | Resolved: 2024-12-05

## 2024-12-05 PROBLEM — Z3A.37 37 WEEKS GESTATION OF PREGNANCY: Status: RESOLVED | Noted: 2024-11-27 | Resolved: 2024-12-05

## 2024-12-05 LAB
BILIRUB BLD-MCNC: NEGATIVE MG/DL
CLARITY, POC: CLEAR
COLOR UR: YELLOW
GLUCOSE UR STRIP-MCNC: NEGATIVE MG/DL
KETONES UR QL: NEGATIVE
LEUKOCYTE EST, POC: NEGATIVE
NITRITE UR-MCNC: NEGATIVE MG/ML
PH UR: 5 [PH] (ref 5–8)
PROT UR STRIP-MCNC: NEGATIVE MG/DL
RBC # UR STRIP: NEGATIVE /UL
SP GR UR: 1 (ref 1–1.03)
UROBILINOGEN UR QL: NORMAL

## 2024-12-05 NOTE — PROGRESS NOTES
OB follow up     CC:  Here for prenatal follow up    Samia Shrestha is a 24 y.o.  38+1 being seen today for her obstetrical visit.  Patient reports no complaints. Taking +PNV.  Desires IOL     Review of Systems    Genitourinary: Neg for cramping, vaginal bleeding, SROM, or dysuria.       /88   Wt 76.2 kg (168 lb)   LMP 2024   BMI 28.84 kg/m²         Vitals: VSS; AF    General Appearance:  Awake. Alert. Well developed. Well nourished. In no acute distress.    Visual Inspection: ° Abdomen was normal on visual inspection.  Palpation: ° Abdomen was soft. ° Abdominal non-tender.    Uterus: ° Fundal height was normal for gestational age. ° Not tender.  Uterine Adnexae: ° Normal without masses or tenderness.  Neurological:  ° Oriented to time, place, and person.  Skin:  ° General appearance was normal. No bruising or ecchymosis.  Obstetrical: +FM and FCA    Presentation: VTX  Placenta: Anterior  HAYDEE: 9.2 cm   FCA: 140's     EFW  3389g 7.8# 61%        Ultrasound images and report reviewed personally by me.          Jefferson Torrez DO      SVE /3    Assessment    1) Pregnancy at 38+  GBS negative     2) FX/SMA neg. Missed AFP d/t late care.   NIPT Low risk     3) Anemia- Taking iron.     4) Covid vaccine declined    5)  S/p Flu vaccine    6) S/P tDap vaccine    7) Tajik-speaking-  used    8) RSV vaccine received     9) EFW EFW  3389g 7.8# 61%  Desires IOL Next week ( 11Dec pm IOL)     Plan    Continue prenatal vitamins   Reviewed this stage of pregnancy  Problem list updated   IOL scheduled     Jefferson Torrez DO     2024  11:24 EST

## 2024-12-11 ENCOUNTER — TELEPHONE (OUTPATIENT)
Dept: OBSTETRICS AND GYNECOLOGY | Facility: HOSPITAL | Age: 24
End: 2024-12-11

## 2024-12-11 ENCOUNTER — HOSPITAL ENCOUNTER (INPATIENT)
Facility: HOSPITAL | Age: 24
LOS: 4 days | Discharge: HOME | End: 2024-12-15
Attending: OBSTETRICS & GYNECOLOGY | Admitting: OBSTETRICS & GYNECOLOGY
Payer: MEDICAID

## 2024-12-11 ENCOUNTER — HOSPITAL ENCOUNTER (OUTPATIENT)
Dept: LABOR AND DELIVERY | Facility: HOSPITAL | Age: 24
Discharge: HOME | End: 2024-12-11
Payer: MEDICAID

## 2024-12-11 PROBLEM — Z3A.38 38 WEEKS GESTATION OF PREGNANCY: Status: RESOLVED | Noted: 2024-12-05 | Resolved: 2024-12-11

## 2024-12-11 PROBLEM — Z34.90 PREGNANCY: Status: ACTIVE | Noted: 2024-12-11

## 2024-12-11 LAB
DEPRECATED RDW RBC AUTO: 57.8 FL (ref 37–54)
ERYTHROCYTE [DISTWIDTH] IN BLOOD BY AUTOMATED COUNT: 17.6 % (ref 12.3–15.4)
HCT VFR BLD AUTO: 39.2 % (ref 34–46.6)
HGB BLD-MCNC: 13.1 G/DL (ref 12–15.9)
MCH RBC QN AUTO: 30.5 PG (ref 26.6–33)
MCHC RBC AUTO-ENTMCNC: 33.4 G/DL (ref 31.5–35.7)
MCV RBC AUTO: 91.4 FL (ref 79–97)
PLATELET # BLD AUTO: 269 10*3/MM3 (ref 140–450)
PMV BLD AUTO: 11.2 FL (ref 6–12)
RBC # BLD AUTO: 4.29 10*6/MM3 (ref 3.77–5.28)
WBC NRBC COR # BLD AUTO: 7.31 10*3/MM3 (ref 3.4–10.8)

## 2024-12-11 PROCEDURE — 86901 BLOOD TYPING SEROLOGIC RH(D): CPT | Performed by: OBSTETRICS & GYNECOLOGY

## 2024-12-11 PROCEDURE — 86901 BLOOD TYPING SEROLOGIC RH(D): CPT

## 2024-12-11 PROCEDURE — 80306 DRUG TEST PRSMV INSTRMNT: CPT | Performed by: OBSTETRICS & GYNECOLOGY

## 2024-12-11 PROCEDURE — 3E033VJ INTRODUCTION OF OTHER HORMONE INTO PERIPHERAL VEIN, PERCUTANEOUS APPROACH: ICD-10-PCS | Performed by: OBSTETRICS & GYNECOLOGY

## 2024-12-11 PROCEDURE — 85027 COMPLETE CBC AUTOMATED: CPT | Performed by: OBSTETRICS & GYNECOLOGY

## 2024-12-11 PROCEDURE — 86780 TREPONEMA PALLIDUM: CPT | Performed by: OBSTETRICS & GYNECOLOGY

## 2024-12-11 PROCEDURE — 86900 BLOOD TYPING SEROLOGIC ABO: CPT

## 2024-12-11 PROCEDURE — 86900 BLOOD TYPING SEROLOGIC ABO: CPT | Performed by: OBSTETRICS & GYNECOLOGY

## 2024-12-11 PROCEDURE — 86850 RBC ANTIBODY SCREEN: CPT | Performed by: OBSTETRICS & GYNECOLOGY

## 2024-12-11 RX ORDER — SODIUM CHLORIDE 0.9 % (FLUSH) 0.9 %
10 SYRINGE (ML) INJECTION EVERY 12 HOURS SCHEDULED
Status: DISCONTINUED | OUTPATIENT
Start: 2024-12-12 | End: 2024-12-13

## 2024-12-11 RX ORDER — SODIUM CHLORIDE, SODIUM LACTATE, POTASSIUM CHLORIDE, CALCIUM CHLORIDE 600; 310; 30; 20 MG/100ML; MG/100ML; MG/100ML; MG/100ML
125 INJECTION, SOLUTION INTRAVENOUS CONTINUOUS
Status: ACTIVE | OUTPATIENT
Start: 2024-12-12 | End: 2024-12-12

## 2024-12-11 RX ORDER — MAGNESIUM CARB/ALUMINUM HYDROX 105-160MG
30 TABLET,CHEWABLE ORAL ONCE
Status: COMPLETED | OUTPATIENT
Start: 2024-12-12 | End: 2024-12-13

## 2024-12-11 RX ORDER — LIDOCAINE HYDROCHLORIDE 10 MG/ML
0.5 INJECTION, SOLUTION INFILTRATION; PERINEURAL ONCE AS NEEDED
Status: DISCONTINUED | OUTPATIENT
Start: 2024-12-11 | End: 2024-12-13

## 2024-12-11 RX ORDER — ACETAMINOPHEN 325 MG/1
650 TABLET ORAL EVERY 4 HOURS PRN
Status: DISCONTINUED | OUTPATIENT
Start: 2024-12-11 | End: 2024-12-13

## 2024-12-11 RX ORDER — SODIUM CHLORIDE 0.9 % (FLUSH) 0.9 %
10 SYRINGE (ML) INJECTION AS NEEDED
Status: DISCONTINUED | OUTPATIENT
Start: 2024-12-11 | End: 2024-12-13

## 2024-12-11 RX ORDER — SODIUM CHLORIDE 9 MG/ML
40 INJECTION, SOLUTION INTRAVENOUS AS NEEDED
Status: DISCONTINUED | OUTPATIENT
Start: 2024-12-11 | End: 2024-12-13

## 2024-12-11 NOTE — TELEPHONE ENCOUNTER
Called patient today@ 1350 to notify her of change in time for tonight's scheduled induction. Used translation id [id number] and spoke to Andrea to translate that patient is to come in tonight @ 8pm instead of 4pm for induction. Patient verbalized understanding.-AURORA Mcallister

## 2024-12-12 ENCOUNTER — ANESTHESIA EVENT (OUTPATIENT)
Dept: OBSTETRICS AND GYNECOLOGY | Facility: HOSPITAL | Age: 24
End: 2024-12-12
Payer: MEDICAID

## 2024-12-12 ENCOUNTER — ANESTHESIA (OUTPATIENT)
Dept: OBSTETRICS AND GYNECOLOGY | Facility: HOSPITAL | Age: 24
End: 2024-12-12
Payer: MEDICAID

## 2024-12-12 PROBLEM — Z34.90 ENCOUNTER FOR ELECTIVE INDUCTION OF LABOR: Status: ACTIVE | Noted: 2024-12-12

## 2024-12-12 LAB
ABO GROUP BLD: NORMAL
ABO GROUP BLD: NORMAL
AMPHET+METHAMPHET UR QL: NEGATIVE
AMPHETAMINES UR QL: NEGATIVE
BARBITURATES UR QL SCN: NEGATIVE
BENZODIAZ UR QL SCN: NEGATIVE
BLD GP AB SCN SERPL QL: NEGATIVE
BUPRENORPHINE SERPL-MCNC: NEGATIVE NG/ML
CANNABINOIDS SERPL QL: NEGATIVE
COCAINE UR QL: NEGATIVE
METHADONE UR QL SCN: NEGATIVE
OPIATES UR QL: NEGATIVE
OXYCODONE UR QL SCN: NEGATIVE
PCP UR QL SCN: NEGATIVE
RH BLD: POSITIVE
RH BLD: POSITIVE
T&S EXPIRATION DATE: NORMAL
TREPONEMA PALLIDUM IGG+IGM AB [PRESENCE] IN SERUM OR PLASMA BY IMMUNOASSAY: NORMAL
TRICYCLICS UR QL SCN: NEGATIVE

## 2024-12-12 PROCEDURE — 25810000003 LACTATED RINGERS PER 1000 ML: Performed by: OBSTETRICS & GYNECOLOGY

## 2024-12-12 PROCEDURE — C1755 CATHETER, INTRASPINAL: HCPCS | Performed by: ANESTHESIOLOGY

## 2024-12-12 PROCEDURE — 25010000002 FENTANYL CITRATE (PF) 50 MCG/ML SOLUTION: Performed by: NURSE ANESTHETIST, CERTIFIED REGISTERED

## 2024-12-12 PROCEDURE — 25010000002 LIDOCAINE-EPINEPHRINE (PF) 1.5 %-1:200000 SOLUTION: Performed by: ANESTHESIOLOGY

## 2024-12-12 PROCEDURE — 25810000003 LACTATED RINGERS SOLUTION: Performed by: OBSTETRICS & GYNECOLOGY

## 2024-12-12 RX ORDER — MISOPROSTOL 100 MCG
25 TABLET ORAL EVERY 4 HOURS PRN
Status: DISCONTINUED | OUTPATIENT
Start: 2024-12-12 | End: 2024-12-12

## 2024-12-12 RX ORDER — FENTANYL/BUPIVACAINE/NS/PF 2-1250MCG
PLASTIC BAG, INJECTION (ML) INJECTION
Status: DISCONTINUED
Start: 2024-12-12 | End: 2024-12-12 | Stop reason: WASHOUT

## 2024-12-12 RX ORDER — LIDOCAINE HYDROCHLORIDE AND EPINEPHRINE 15; 5 MG/ML; UG/ML
INJECTION, SOLUTION EPIDURAL AS NEEDED
Status: DISCONTINUED | OUTPATIENT
Start: 2024-12-12 | End: 2024-12-13 | Stop reason: SURG

## 2024-12-12 RX ORDER — FENTANYL/BUPIVACAINE/NS/PF 2-1250MCG
PLASTIC BAG, INJECTION (ML) INJECTION CONTINUOUS
Status: DISCONTINUED | OUTPATIENT
Start: 2024-12-12 | End: 2024-12-13

## 2024-12-12 RX ORDER — FENTANYL/BUPIVACAINE/NS/PF 2-1250MCG
PLASTIC BAG, INJECTION (ML) INJECTION
Status: COMPLETED
Start: 2024-12-12 | End: 2024-12-12

## 2024-12-12 RX ORDER — EPHEDRINE SULFATE 5 MG/ML
10 INJECTION INTRAVENOUS
Status: DISCONTINUED | OUTPATIENT
Start: 2024-12-12 | End: 2024-12-13

## 2024-12-12 RX ORDER — FENTANYL CITRATE 50 UG/ML
INJECTION, SOLUTION INTRAMUSCULAR; INTRAVENOUS
Status: COMPLETED
Start: 2024-12-12 | End: 2024-12-12

## 2024-12-12 RX ORDER — FENTANYL CITRATE 50 UG/ML
INJECTION, SOLUTION INTRAMUSCULAR; INTRAVENOUS AS NEEDED
Status: DISCONTINUED | OUTPATIENT
Start: 2024-12-12 | End: 2024-12-13 | Stop reason: SURG

## 2024-12-12 RX ORDER — SODIUM CHLORIDE, SODIUM LACTATE, POTASSIUM CHLORIDE, CALCIUM CHLORIDE 600; 310; 30; 20 MG/100ML; MG/100ML; MG/100ML; MG/100ML
125 INJECTION, SOLUTION INTRAVENOUS CONTINUOUS
Status: ACTIVE | OUTPATIENT
Start: 2024-12-12 | End: 2024-12-13

## 2024-12-12 RX ORDER — OXYTOCIN/0.9 % SODIUM CHLORIDE 30/500 ML
2-20 PLASTIC BAG, INJECTION (ML) INTRAVENOUS
Status: DISCONTINUED | OUTPATIENT
Start: 2024-12-12 | End: 2024-12-13

## 2024-12-12 RX ORDER — LIDOCAINE HYDROCHLORIDE 10 MG/ML
30 INJECTION, SOLUTION INFILTRATION; PERINEURAL ONCE
Status: DISCONTINUED | OUTPATIENT
Start: 2024-12-12 | End: 2024-12-13

## 2024-12-12 RX ADMIN — LIDOCAINE HYDROCHLORIDE AND EPINEPHRINE 2 MG: 15; 5 INJECTION, SOLUTION EPIDURAL at 19:47

## 2024-12-12 RX ADMIN — SODIUM CHLORIDE, SODIUM LACTATE, POTASSIUM CHLORIDE, CALCIUM CHLORIDE 125 ML/HR: 20; 30; 600; 310 INJECTION, SOLUTION INTRAVENOUS at 13:00

## 2024-12-12 RX ADMIN — SODIUM CHLORIDE, SODIUM LACTATE, POTASSIUM CHLORIDE, CALCIUM CHLORIDE 1000 ML: 20; 30; 600; 310 INJECTION, SOLUTION INTRAVENOUS at 17:45

## 2024-12-12 RX ADMIN — Medication 8 ML/HR: at 19:50

## 2024-12-12 RX ADMIN — SODIUM CHLORIDE, SODIUM LACTATE, POTASSIUM CHLORIDE, CALCIUM CHLORIDE 125 ML/HR: 20; 30; 600; 310 INJECTION, SOLUTION INTRAVENOUS at 21:48

## 2024-12-12 RX ADMIN — FENTANYL CITRATE 10 MCG: 50 INJECTION, SOLUTION INTRAMUSCULAR; INTRAVENOUS at 19:21

## 2024-12-12 RX ADMIN — Medication 2 MILLI-UNITS/MIN: at 13:17

## 2024-12-12 RX ADMIN — Medication 25 MCG: at 07:09

## 2024-12-12 RX ADMIN — LIDOCAINE HYDROCHLORIDE AND EPINEPHRINE 3 MG: 15; 5 INJECTION, SOLUTION EPIDURAL at 19:44

## 2024-12-12 RX ADMIN — Medication 25 MCG: at 11:17

## 2024-12-12 RX ADMIN — ACETAMINOPHEN 650 MG: 325 TABLET ORAL at 13:28

## 2024-12-12 NOTE — PROGRESS NOTES
Nitrous was administered.  90% and 8 cm.  Recommended epidural under nitrous.  Patient considering.    Tay Day MD

## 2024-12-12 NOTE — PLAN OF CARE
Problem: Adult Inpatient Plan of Care  Goal: Plan of Care Review  Outcome: Progressing  Goal: Patient-Specific Goal (Individualized)  Outcome: Progressing  Goal: Absence of Hospital-Acquired Illness or Injury  Outcome: Progressing  Intervention: Identify and Manage Fall Risk  Recent Flowsheet Documentation  Taken 12/12/2024 1326 by Yesica Davis RN  Safety Promotion/Fall Prevention: safety round/check completed  Taken 12/12/2024 0715 by Yesica Davis RN  Safety Promotion/Fall Prevention: safety round/check completed  Goal: Optimal Comfort and Wellbeing  Outcome: Progressing  Intervention: Provide Person-Centered Care  Recent Flowsheet Documentation  Taken 12/12/2024 0715 by Yesica Davis RN  Trust Relationship/Rapport:   care explained   choices provided  Goal: Readiness for Transition of Care  Outcome: Progressing     Problem: Anesthesia/Analgesia, Neuraxial  Goal: Safe, Effective Infusion Delivery  Outcome: Progressing  Goal: Stable Patient-Fetal Status  Outcome: Progressing  Goal: Absence of Infection Signs and Symptoms  Outcome: Progressing  Goal: Nausea and Vomiting Relief  Outcome: Progressing  Goal: Optimal Pain Control and Function  Outcome: Progressing  Goal: Effective Oxygenation and Ventilation  Outcome: Progressing  Goal: Baseline Motor Function Return  Outcome: Progressing  Intervention: Optimize Sensorimotor Function and Safety  Recent Flowsheet Documentation  Taken 12/12/2024 1326 by Yesica Davis RN  Safety Promotion/Fall Prevention: safety round/check completed  Taken 12/12/2024 0715 by Yesica Davis RN  Safety Promotion/Fall Prevention: safety round/check completed  Goal: Effective Urinary Elimination  Outcome: Progressing   Goal Outcome Evaluation:

## 2024-12-12 NOTE — PLAN OF CARE
Goal Outcome Evaluation:                      VSS, Patient has cook balloon in place and bry regularly, fetal tracing WNL for gestational age

## 2024-12-12 NOTE — NURSING NOTE
Fredrick HAYES at bedside. Pt requested epidural. After discussing using an , the pt said she can not sit still for an epidural. No epidural placed at this time.

## 2024-12-12 NOTE — PROGRESS NOTES
Into see patient after shift change.  Desired epidural.  Patient did not think she could sit still long enough to receive epidural.   used to consent the patient and she did not think she could do it.  Desires nitrous.  Verbal consent obtained after discussing nitrous risk benefits alternatives.  Risk of increased nausea vomiting.  Understands she must use it slowly and no one else in the room can use it.  Early decelerations noted consistent with late for stage and fetal head compression.  Anticipate spontaneous vaginal delivery soon.    Tay Day MD

## 2024-12-12 NOTE — H&P
OB HISTORY AND PHYSICAL      Patient Care Team:  Provider, No Known as PCP - Jefferson Stevens DO as Consulting Physician (Obstetrics and Gynecology)    Chief complaint: <principal problem not specified>    24 y.o.  . Patient's last menstrual period was 2024. = 39w0d     HPI: History of Present Illness  Pt admitted for elective IOL @ 39 weeks. No major prenatal problems.       ACTIVE PROBLEM LIST:     Uses Chadian as primary spoken language    Maternal anemia in pregnancy, antepartum       PMHx: History reviewed. No pertinent past medical history.    PSHx: History reviewed. No pertinent surgical history.    Social Hx:   Social History     Socioeconomic History    Marital status:    Tobacco Use    Smoking status: Never     Passive exposure: Never    Smokeless tobacco: Never   Vaping Use    Vaping status: Never Used   Substance and Sexual Activity    Alcohol use: Never    Drug use: Never    Sexual activity: Yes     Partners: Male     Birth control/protection: Birth control pill       FHx: History reviewed. No pertinent family history.    Debilities/Disabilities Identified: None    Emotional Behavior: Appropriate    PGyn Hx:  otherwise neg    POBHx:   OB History    Para Term  AB Living   1 0 0 0 0 0   SAB IAB Ectopic Molar Multiple Live Births   0 0 0 0 0 0      # Outcome Date GA Lbr Roby/2nd Weight Sex Type Anes PTL Lv   1 Current                Allergies: Patient has no known allergies.    Medications:   Medications Prior to Admission   Medication Sig Dispense Refill Last Dose/Taking    ferrous gluconate (FERGON) 324 MG tablet Take 1 tablet by mouth Daily With Breakfast. 100 tablet 2 2024 Morning    Prenatal Vit-Fe Fumarate-FA (PRENATAL PO) Take  by mouth.   2024 Morning                            No current facility-administered medications for this encounter.    Review of Systems   Constitutional: Negative.    HENT: Negative.     Eyes: Negative.    Respiratory:  Negative.     Cardiovascular: Negative.    Gastrointestinal: Negative.    Endocrine: Negative.    Musculoskeletal: Negative.    Skin: Negative.    Allergic/Immunologic: Negative.    Neurological: Negative.    Hematological: Negative.    Psychiatric/Behavioral: Negative.         Vital Signs  /90 (BP Location: Right arm, Patient Position: Lying)   Pulse 78   Temp 98.9 °F (37.2 °C) (Oral)   Resp 18   LMP 03/13/2024     Physical Exam  Vitals and nursing note reviewed.   Constitutional:       Appearance: She is well-developed.   HENT:      Head: Normocephalic and atraumatic.   Cardiovascular:      Rate and Rhythm: Normal rate.   Pulmonary:      Effort: Pulmonary effort is normal.   Abdominal:      General: There is no distension.      Palpations: Abdomen is soft. There is no mass.      Tenderness: There is no abdominal tenderness. There is no guarding.   Genitourinary:     Vagina: No vaginal discharge.   Musculoskeletal:         General: No tenderness or deformity. Normal range of motion.      Cervical back: Normal range of motion.   Skin:     General: Skin is warm and dry.      Coloration: Skin is not pale.      Findings: No erythema or rash.   Neurological:      Mental Status: She is alert and oriented to person, place, and time.   Psychiatric:         Behavior: Behavior normal.         Thought Content: Thought content normal.         Judgment: Judgment normal.             Presentation: cephalic   Cervix: Exam by:     Dilation:  1   Effacement:  50   Station:       COOK Catheter Placement     Indications: IOL    Post procedure diagnosis:  same    Procedure Details     The risks and benefits of the procedure and Verbal informed consent obtained.    Speculum placed in vagina and excellent visualization of cervix achieved. Cook catheter placed in standard fashion; inflating both balloons with sterile water. 60/60    FWB noted while placing Cook catheter.     Tolerated well  No apparent complication     Findings:  none.    Complications: none.    Assessment:      Uses Italian as primary spoken language    Maternal anemia in pregnancy, antepartum      1.  Intrauterine pregnancy at 39w0d gestation with reactive fetal status.    2.  induction of labor  for pt desire  with unfavorable cervix  3.  Obstetrical history significant for is non-contributory.  4.  GBS status:   Strep Gp B Culture   Date Value Ref Range Status   2024 Negative Negative Final     Comment:     Centers for Disease Control and Prevention (CDC) and American Congress  of Obstetricians and Gynecologists (ACOG) guidelines for prevention of   group B streptococcal (GBS) disease specify co-collection of  a vaginal and rectal swab specimen to maximize sensitivity of GBS  detection. Per the CDC and ACOG, swabbing both the lower vagina and  rectum substantially increases the yield of detection compared with  sampling the vagina alone.  Penicillin G, ampicillin, or cefazolin are indicated for intrapartum  prophylaxis of  GBS colonization. Reflex susceptibility  testing should be performed prior to use of clindamycin only on GBS  isolates from penicillin-allergic women who are considered a high risk  for anaphylaxis. Treatment with vancomycin without additional testing  is warranted if resistance to clindamycin is noted.         Plan:  1. Vaginal anticipated  2. Plan of care has been reviewed with patient and family  3.  Risks, benefits of treatment plan have been discussed.  4.  All questions have been answered.          I discussed the patient's findings and my recommendations with patient and family:    For induction of labor:   Induction of Labor  Risks have been outlined to include (but not limited to) infection, uterine tetany with uterine rupture and/or fetal distress, need for emergent  delivery and possible  hysterectomy, possible postpartum hemorrhage secondary to uterine atony which could be substantial enough to  require blood transfusion with the inherent risks of hepatitis, AIDS, HIV infection as well as transfusion reaction. She understands the need for use of this induction method and desires to proceed. There is no evidence of cephalopelvic disproportion or fetal distress, or any contraindication to the induction technique selected    Consent for Vaginal Delivery or  Section:   Pt. counseled as to risk of labor and delivery including but not limited to infection, bleeding (with possible need for blood transfusion, and its inherent risks of virus transmission, i.e. HIV, Hepatitis; possible transfusion reaction), possible emergent  section with its risks of damage to internal organs and necessary repairs, possible operative vaginal delivery, NON routine use of episiotomy and routine use of internal monitors and associated risks, anesthetic complications, injury to infant or mother at time of delivery, maternal or fetal death.    I counseled pt and her significant other/family member ref: indications for a  delivery including but not limited to 1. Maternal intolerance to labor, 2. Fetal intolerance to labor or fetal distress, 3. Failed induction, CPD or dystocia.  Pt and her family verbalized their understanding.   The pt. understands these risks and is willing to proceed.  All of her questions were answered.     .    Edward Morelos MD  24  22:43 EST

## 2024-12-12 NOTE — NURSING NOTE
Kerns bulb removed after 12 hours. Yesica SIMON and Deja SIMON attempted to check the pts cervix but unable to complete cervical exam due to difficulty. RN requested Dr. Morelos to check the pt. Dr. Morelos said he would come check her when able.

## 2024-12-13 PROBLEM — Z34.90 PREGNANCY: Status: RESOLVED | Noted: 2024-12-11 | Resolved: 2024-12-13

## 2024-12-13 PROBLEM — Z34.90 ENCOUNTER FOR ELECTIVE INDUCTION OF LABOR: Status: RESOLVED | Noted: 2024-12-12 | Resolved: 2024-12-13

## 2024-12-13 PROBLEM — O41.1230 CHORIOAMNIONITIS IN THIRD TRIMESTER: Status: ACTIVE | Noted: 2024-12-13

## 2024-12-13 LAB
BASOPHILS # BLD AUTO: 0.06 10*3/MM3 (ref 0–0.2)
BASOPHILS NFR BLD AUTO: 0.3 % (ref 0–1.5)
D-LACTATE SERPL-SCNC: 1.5 MMOL/L (ref 0.5–2)
DEPRECATED RDW RBC AUTO: 62.6 FL (ref 37–54)
EOSINOPHIL # BLD AUTO: 0.07 10*3/MM3 (ref 0–0.4)
EOSINOPHIL NFR BLD AUTO: 0.4 % (ref 0.3–6.2)
ERYTHROCYTE [DISTWIDTH] IN BLOOD BY AUTOMATED COUNT: 18.5 % (ref 12.3–15.4)
HCT VFR BLD AUTO: 32.2 % (ref 34–46.6)
HGB BLD-MCNC: 10.5 G/DL (ref 12–15.9)
IMM GRANULOCYTES # BLD AUTO: 0.12 10*3/MM3 (ref 0–0.05)
IMM GRANULOCYTES NFR BLD AUTO: 0.6 % (ref 0–0.5)
LYMPHOCYTES # BLD AUTO: 1.25 10*3/MM3 (ref 0.7–3.1)
LYMPHOCYTES NFR BLD AUTO: 6.5 % (ref 19.6–45.3)
MCH RBC QN AUTO: 30.5 PG (ref 26.6–33)
MCHC RBC AUTO-ENTMCNC: 32.6 G/DL (ref 31.5–35.7)
MCV RBC AUTO: 93.6 FL (ref 79–97)
MONOCYTES # BLD AUTO: 0.8 10*3/MM3 (ref 0.1–0.9)
MONOCYTES NFR BLD AUTO: 4.2 % (ref 5–12)
NEUTROPHILS NFR BLD AUTO: 16.96 10*3/MM3 (ref 1.7–7)
NEUTROPHILS NFR BLD AUTO: 88 % (ref 42.7–76)
NRBC BLD AUTO-RTO: 0 /100 WBC (ref 0–0.2)
PLATELET # BLD AUTO: 241 10*3/MM3 (ref 140–450)
PMV BLD AUTO: 10.4 FL (ref 6–12)
PROCALCITONIN SERPL-MCNC: 0.51 NG/ML (ref 0–0.25)
RBC # BLD AUTO: 3.44 10*6/MM3 (ref 3.77–5.28)
WBC NRBC COR # BLD AUTO: 19.26 10*3/MM3 (ref 3.4–10.8)

## 2024-12-13 PROCEDURE — 25010000002 MORPHINE PER 10 MG: Performed by: ANESTHESIOLOGY

## 2024-12-13 PROCEDURE — 25810000003 SODIUM CHLORIDE 0.9 % SOLUTION: Performed by: OBSTETRICS & GYNECOLOGY

## 2024-12-13 PROCEDURE — 25010000002 KETOROLAC TROMETHAMINE PER 15 MG: Performed by: OBSTETRICS & GYNECOLOGY

## 2024-12-13 PROCEDURE — 25810000003 LACTATED RINGERS PER 1000 ML: Performed by: OBSTETRICS & GYNECOLOGY

## 2024-12-13 PROCEDURE — 25010000002 FENTANYL CITRATE (PF) 50 MCG/ML SOLUTION: Performed by: OBSTETRICS & GYNECOLOGY

## 2024-12-13 PROCEDURE — 84145 PROCALCITONIN (PCT): CPT | Performed by: OBSTETRICS & GYNECOLOGY

## 2024-12-13 PROCEDURE — 85025 COMPLETE CBC W/AUTO DIFF WBC: CPT | Performed by: OBSTETRICS & GYNECOLOGY

## 2024-12-13 PROCEDURE — 25010000002 PROPOFOL 200 MG/20ML EMULSION: Performed by: ANESTHESIOLOGY

## 2024-12-13 PROCEDURE — 25010000002 METRONIDAZOLE 500 MG/100ML SOLUTION: Performed by: OBSTETRICS & GYNECOLOGY

## 2024-12-13 PROCEDURE — 25010000002 ONDANSETRON PER 1 MG: Performed by: ANESTHESIOLOGY

## 2024-12-13 PROCEDURE — 25010000002 CLINDAMYCIN 900 MG/50ML SOLUTION: Performed by: OBSTETRICS & GYNECOLOGY

## 2024-12-13 PROCEDURE — 25010000002 OXYTOCIN PER 10 UNITS: Performed by: OBSTETRICS & GYNECOLOGY

## 2024-12-13 PROCEDURE — 83605 ASSAY OF LACTIC ACID: CPT | Performed by: OBSTETRICS & GYNECOLOGY

## 2024-12-13 PROCEDURE — 25010000002 AMPICILLIN PER 500 MG: Performed by: OBSTETRICS & GYNECOLOGY

## 2024-12-13 PROCEDURE — 10907ZC DRAINAGE OF AMNIOTIC FLUID, THERAPEUTIC FROM PRODUCTS OF CONCEPTION, VIA NATURAL OR ARTIFICIAL OPENING: ICD-10-PCS | Performed by: OBSTETRICS & GYNECOLOGY

## 2024-12-13 PROCEDURE — 25010000002 GENTAMICIN PER 80 MG: Performed by: OBSTETRICS & GYNECOLOGY

## 2024-12-13 PROCEDURE — 25010000002 BUPIVACAINE (PF) 0.5 % SOLUTION: Performed by: ANESTHESIOLOGY

## 2024-12-13 PROCEDURE — 88307 TISSUE EXAM BY PATHOLOGIST: CPT

## 2024-12-13 PROCEDURE — 94799 UNLISTED PULMONARY SVC/PX: CPT

## 2024-12-13 RX ORDER — ACETAMINOPHEN 325 MG/1
650 TABLET ORAL EVERY 6 HOURS
Status: DISCONTINUED | OUTPATIENT
Start: 2024-12-14 | End: 2024-12-15 | Stop reason: HOSPADM

## 2024-12-13 RX ORDER — FERROUS SULFATE 325(65) MG
325 TABLET ORAL
Status: DISCONTINUED | OUTPATIENT
Start: 2024-12-13 | End: 2024-12-15 | Stop reason: HOSPADM

## 2024-12-13 RX ORDER — PRENATAL VIT/IRON FUM/FOLIC AC 27MG-0.8MG
1 TABLET ORAL DAILY
Status: DISCONTINUED | OUTPATIENT
Start: 2024-12-13 | End: 2024-12-15 | Stop reason: HOSPADM

## 2024-12-13 RX ORDER — OXYCODONE HYDROCHLORIDE 5 MG/1
5 TABLET ORAL EVERY 4 HOURS PRN
Status: DISCONTINUED | OUTPATIENT
Start: 2024-12-13 | End: 2024-12-15 | Stop reason: HOSPADM

## 2024-12-13 RX ORDER — IBUPROFEN 600 MG/1
600 TABLET, FILM COATED ORAL EVERY 6 HOURS
Status: DISCONTINUED | OUTPATIENT
Start: 2024-12-14 | End: 2024-12-15 | Stop reason: HOSPADM

## 2024-12-13 RX ORDER — TRANEXAMIC ACID 10 MG/ML
INJECTION, SOLUTION INTRAVENOUS
Status: DISCONTINUED
Start: 2024-12-13 | End: 2024-12-13 | Stop reason: WASHOUT

## 2024-12-13 RX ORDER — FAMOTIDINE 10 MG/ML
INJECTION, SOLUTION INTRAVENOUS AS NEEDED
Status: DISCONTINUED | OUTPATIENT
Start: 2024-12-13 | End: 2024-12-13 | Stop reason: SURG

## 2024-12-13 RX ORDER — MORPHINE SULFATE 1 MG/ML
INJECTION, SOLUTION EPIDURAL; INTRATHECAL; INTRAVENOUS AS NEEDED
Status: DISCONTINUED | OUTPATIENT
Start: 2024-12-13 | End: 2024-12-13 | Stop reason: SURG

## 2024-12-13 RX ORDER — KETOROLAC TROMETHAMINE 30 MG/ML
30 INJECTION, SOLUTION INTRAMUSCULAR; INTRAVENOUS ONCE
Status: COMPLETED | OUTPATIENT
Start: 2024-12-13 | End: 2024-12-13

## 2024-12-13 RX ORDER — OXYTOCIN/0.9 % SODIUM CHLORIDE 30/500 ML
125 PLASTIC BAG, INJECTION (ML) INTRAVENOUS ONCE AS NEEDED
Status: DISCONTINUED | OUTPATIENT
Start: 2024-12-13 | End: 2024-12-15 | Stop reason: HOSPADM

## 2024-12-13 RX ORDER — PROPOFOL 10 MG/ML
INJECTION, EMULSION INTRAVENOUS AS NEEDED
Status: DISCONTINUED | OUTPATIENT
Start: 2024-12-13 | End: 2024-12-13 | Stop reason: SURG

## 2024-12-13 RX ORDER — CLINDAMYCIN PHOSPHATE 900 MG/50ML
900 INJECTION, SOLUTION INTRAVENOUS EVERY 12 HOURS
Status: DISCONTINUED | OUTPATIENT
Start: 2024-12-13 | End: 2024-12-15

## 2024-12-13 RX ORDER — KETAMINE HYDROCHLORIDE 10 MG/ML
INJECTION, SOLUTION INTRAMUSCULAR; INTRAVENOUS AS NEEDED
Status: DISCONTINUED | OUTPATIENT
Start: 2024-12-13 | End: 2024-12-13 | Stop reason: SURG

## 2024-12-13 RX ORDER — FENTANYL CITRATE 50 UG/ML
100 INJECTION, SOLUTION INTRAMUSCULAR; INTRAVENOUS ONCE
Status: COMPLETED | OUTPATIENT
Start: 2024-12-13 | End: 2024-12-13

## 2024-12-13 RX ORDER — BUPIVACAINE HYDROCHLORIDE 5 MG/ML
INJECTION, SOLUTION EPIDURAL; INTRACAUDAL AS NEEDED
Status: DISCONTINUED | OUTPATIENT
Start: 2024-12-13 | End: 2024-12-13 | Stop reason: SURG

## 2024-12-13 RX ORDER — SCOLOPAMINE TRANSDERMAL SYSTEM 1 MG/1
PATCH, EXTENDED RELEASE TRANSDERMAL AS NEEDED
Status: DISCONTINUED | OUTPATIENT
Start: 2024-12-13 | End: 2024-12-13 | Stop reason: SURG

## 2024-12-13 RX ORDER — ACETAMINOPHEN 500 MG
1000 TABLET ORAL EVERY 6 HOURS
Status: COMPLETED | OUTPATIENT
Start: 2024-12-13 | End: 2024-12-14

## 2024-12-13 RX ORDER — DIPHENHYDRAMINE HCL 25 MG
25 CAPSULE ORAL EVERY 4 HOURS PRN
Status: DISCONTINUED | OUTPATIENT
Start: 2024-12-13 | End: 2024-12-15 | Stop reason: HOSPADM

## 2024-12-13 RX ORDER — METRONIDAZOLE 500 MG/100ML
500 INJECTION, SOLUTION INTRAVENOUS ONCE
Status: COMPLETED | OUTPATIENT
Start: 2024-12-13 | End: 2024-12-13

## 2024-12-13 RX ORDER — SIMETHICONE 80 MG
80 TABLET,CHEWABLE ORAL 4 TIMES DAILY PRN
Status: DISCONTINUED | OUTPATIENT
Start: 2024-12-13 | End: 2024-12-15 | Stop reason: HOSPADM

## 2024-12-13 RX ORDER — ONDANSETRON 2 MG/ML
INJECTION INTRAMUSCULAR; INTRAVENOUS AS NEEDED
Status: DISCONTINUED | OUTPATIENT
Start: 2024-12-13 | End: 2024-12-13 | Stop reason: SURG

## 2024-12-13 RX ORDER — SODIUM CHLORIDE 9 MG/ML
INJECTION, SOLUTION INTRAVENOUS
Status: DISPENSED
Start: 2024-12-13 | End: 2024-12-13

## 2024-12-13 RX ORDER — FERROUS GLUCONATE 324(38)MG
324 TABLET ORAL
Status: DISCONTINUED | OUTPATIENT
Start: 2024-12-13 | End: 2024-12-13 | Stop reason: CLARIF

## 2024-12-13 RX ORDER — OXYTOCIN/0.9 % SODIUM CHLORIDE 30/500 ML
250 PLASTIC BAG, INJECTION (ML) INTRAVENOUS CONTINUOUS
Status: ACTIVE | OUTPATIENT
Start: 2024-12-13 | End: 2024-12-13

## 2024-12-13 RX ORDER — DIPHENHYDRAMINE HYDROCHLORIDE 50 MG/ML
25 INJECTION INTRAMUSCULAR; INTRAVENOUS EVERY 4 HOURS PRN
Status: DISCONTINUED | OUTPATIENT
Start: 2024-12-13 | End: 2024-12-15 | Stop reason: HOSPADM

## 2024-12-13 RX ORDER — FAMOTIDINE 10 MG/ML
INJECTION, SOLUTION INTRAVENOUS
Status: COMPLETED
Start: 2024-12-13 | End: 2024-12-13

## 2024-12-13 RX ORDER — OXYTOCIN/0.9 % SODIUM CHLORIDE 30/500 ML
999 PLASTIC BAG, INJECTION (ML) INTRAVENOUS ONCE
Status: DISCONTINUED | OUTPATIENT
Start: 2024-12-13 | End: 2024-12-15 | Stop reason: HOSPADM

## 2024-12-13 RX ORDER — KETOROLAC TROMETHAMINE 30 MG/ML
15 INJECTION, SOLUTION INTRAMUSCULAR; INTRAVENOUS EVERY 6 HOURS
Status: COMPLETED | OUTPATIENT
Start: 2024-12-13 | End: 2024-12-14

## 2024-12-13 RX ORDER — METHYLERGONOVINE MALEATE 0.2 MG/ML
200 INJECTION INTRAVENOUS ONCE AS NEEDED
Status: DISCONTINUED | OUTPATIENT
Start: 2024-12-13 | End: 2024-12-15 | Stop reason: HOSPADM

## 2024-12-13 RX ORDER — OXYTOCIN/0.9 % SODIUM CHLORIDE 30/500 ML
PLASTIC BAG, INJECTION (ML) INTRAVENOUS CONTINUOUS PRN
Status: DISCONTINUED | OUTPATIENT
Start: 2024-12-13 | End: 2024-12-13 | Stop reason: SURG

## 2024-12-13 RX ORDER — OXYCODONE HYDROCHLORIDE 5 MG/1
10 TABLET ORAL EVERY 4 HOURS PRN
Status: DISCONTINUED | OUTPATIENT
Start: 2024-12-13 | End: 2024-12-15 | Stop reason: HOSPADM

## 2024-12-13 RX ORDER — METHYLERGONOVINE MALEATE 0.2 MG/ML
INJECTION INTRAVENOUS
Status: DISCONTINUED
Start: 2024-12-13 | End: 2024-12-13 | Stop reason: WASHOUT

## 2024-12-13 RX ORDER — CARBOPROST TROMETHAMINE 250 UG/ML
250 INJECTION, SOLUTION INTRAMUSCULAR
Status: DISCONTINUED | OUTPATIENT
Start: 2024-12-13 | End: 2024-12-15 | Stop reason: HOSPADM

## 2024-12-13 RX ORDER — PROMETHAZINE HYDROCHLORIDE 25 MG/1
25 TABLET ORAL EVERY 6 HOURS PRN
Status: DISCONTINUED | OUTPATIENT
Start: 2024-12-13 | End: 2024-12-15 | Stop reason: HOSPADM

## 2024-12-13 RX ORDER — MISOPROSTOL 200 UG/1
800 TABLET ORAL ONCE AS NEEDED
Status: DISCONTINUED | OUTPATIENT
Start: 2024-12-13 | End: 2024-12-15 | Stop reason: HOSPADM

## 2024-12-13 RX ORDER — ONDANSETRON 4 MG/1
4 TABLET, ORALLY DISINTEGRATING ORAL EVERY 6 HOURS PRN
Status: DISCONTINUED | OUTPATIENT
Start: 2024-12-13 | End: 2024-12-15 | Stop reason: HOSPADM

## 2024-12-13 RX ORDER — ENOXAPARIN SODIUM 100 MG/ML
40 INJECTION SUBCUTANEOUS NIGHTLY
Status: DISCONTINUED | OUTPATIENT
Start: 2024-12-14 | End: 2024-12-15 | Stop reason: HOSPADM

## 2024-12-13 RX ORDER — GENTAMICIN 40 MG/ML
INJECTION, SOLUTION INTRAMUSCULAR; INTRAVENOUS
Status: DISCONTINUED
Start: 2024-12-13 | End: 2024-12-13 | Stop reason: WASHOUT

## 2024-12-13 RX ORDER — ONDANSETRON 2 MG/ML
4 INJECTION INTRAMUSCULAR; INTRAVENOUS EVERY 6 HOURS PRN
Status: DISCONTINUED | OUTPATIENT
Start: 2024-12-13 | End: 2024-12-15 | Stop reason: HOSPADM

## 2024-12-13 RX ORDER — PROMETHAZINE HYDROCHLORIDE 25 MG/1
12.5 SUPPOSITORY RECTAL EVERY 6 HOURS PRN
Status: DISCONTINUED | OUTPATIENT
Start: 2024-12-13 | End: 2024-12-15 | Stop reason: HOSPADM

## 2024-12-13 RX ADMIN — KETOROLAC TROMETHAMINE 15 MG: 30 INJECTION, SOLUTION INTRAMUSCULAR; INTRAVENOUS at 13:51

## 2024-12-13 RX ADMIN — AMPICILLIN SODIUM 2 G: 2 INJECTION, POWDER, FOR SOLUTION INTRAVENOUS at 01:58

## 2024-12-13 RX ADMIN — FENTANYL CITRATE 100 MCG: 50 INJECTION, SOLUTION INTRAMUSCULAR; INTRAVENOUS at 09:41

## 2024-12-13 RX ADMIN — OXYTOCIN-SODIUM CHLORIDE 0.9% IV SOLN 30 UNIT/500ML 350 ML/HR: 30-0.9/5 SOLUTION at 06:52

## 2024-12-13 RX ADMIN — AMPICILLIN SODIUM 2 G: 2 INJECTION, POWDER, FOR SOLUTION INTRAVENOUS at 07:48

## 2024-12-13 RX ADMIN — SODIUM CHLORIDE, SODIUM LACTATE, POTASSIUM CHLORIDE, CALCIUM CHLORIDE 125 ML/HR: 20; 30; 600; 310 INJECTION, SOLUTION INTRAVENOUS at 07:24

## 2024-12-13 RX ADMIN — GENTAMICIN SULFATE 380 MG: 40 INJECTION, SOLUTION INTRAMUSCULAR; INTRAVENOUS at 02:35

## 2024-12-13 RX ADMIN — ACETAMINOPHEN 1000 MG: 500 TABLET ORAL at 13:50

## 2024-12-13 RX ADMIN — PRENATAL VIT W/ FE FUMARATE-FA TAB 27-0.8 MG 1 TABLET: 27-0.8 TAB at 07:43

## 2024-12-13 RX ADMIN — ACETAMINOPHEN 1000 MG: 500 TABLET ORAL at 23:10

## 2024-12-13 RX ADMIN — CLINDAMYCIN PHOSPHATE 900 MG: 900 INJECTION, SOLUTION INTRAVENOUS at 10:14

## 2024-12-13 RX ADMIN — OXYTOCIN 250 ML/HR: 10 INJECTION INTRAVENOUS at 07:23

## 2024-12-13 RX ADMIN — KETOROLAC TROMETHAMINE 30 MG: 30 INJECTION, SOLUTION INTRAMUSCULAR; INTRAVENOUS at 07:41

## 2024-12-13 RX ADMIN — AMPICILLIN SODIUM 2 G: 2 INJECTION, POWDER, FOR SOLUTION INTRAVENOUS at 13:54

## 2024-12-13 RX ADMIN — ACETAMINOPHEN 650 MG: 325 TABLET ORAL at 01:46

## 2024-12-13 RX ADMIN — MORPHINE SULFATE 2.5 MG: 1 INJECTION, SOLUTION EPIDURAL; INTRATHECAL; INTRAVENOUS at 06:45

## 2024-12-13 RX ADMIN — BUPIVACAINE HYDROCHLORIDE 5 MG: 5 INJECTION, SOLUTION EPIDURAL; INTRACAUDAL; PERINEURAL at 05:46

## 2024-12-13 RX ADMIN — PROPOFOL 30 MG: 10 INJECTION, EMULSION INTRAVENOUS at 06:51

## 2024-12-13 RX ADMIN — PROPOFOL 20 MG: 10 INJECTION, EMULSION INTRAVENOUS at 07:00

## 2024-12-13 RX ADMIN — CLINDAMYCIN PHOSPHATE 900 MG: 900 INJECTION, SOLUTION INTRAVENOUS at 22:51

## 2024-12-13 RX ADMIN — METRONIDAZOLE 500 MG: 5 INJECTION, SOLUTION INTRAVENOUS at 05:57

## 2024-12-13 RX ADMIN — MINERAL OIL 30 ML: 1000 SOLUTION ORAL at 02:58

## 2024-12-13 RX ADMIN — SCOPOLAMINE 1 PATCH: 1.5 PATCH, EXTENDED RELEASE TRANSDERMAL at 06:03

## 2024-12-13 RX ADMIN — MORPHINE SULFATE 2 MG: 1 INJECTION, SOLUTION EPIDURAL; INTRATHECAL; INTRAVENOUS at 06:53

## 2024-12-13 RX ADMIN — AMPICILLIN SODIUM 2 G: 2 INJECTION, POWDER, FOR SOLUTION INTRAVENOUS at 20:57

## 2024-12-13 RX ADMIN — MORPHINE SULFATE 2 MG: 1 INJECTION, SOLUTION EPIDURAL; INTRATHECAL; INTRAVENOUS at 07:02

## 2024-12-13 RX ADMIN — BUPIVACAINE HYDROCHLORIDE 5 MG: 5 INJECTION, SOLUTION EPIDURAL; INTRACAUDAL; PERINEURAL at 06:04

## 2024-12-13 RX ADMIN — Medication 10 ML/HR: at 02:40

## 2024-12-13 RX ADMIN — ACETAMINOPHEN 1000 MG: 500 TABLET ORAL at 07:43

## 2024-12-13 RX ADMIN — KETAMINE HYDROCHLORIDE 15 MG: 10 INJECTION, SOLUTION INTRAMUSCULAR; INTRAVENOUS at 06:56

## 2024-12-13 RX ADMIN — FAMOTIDINE 20 MG: 10 INJECTION INTRAVENOUS at 06:22

## 2024-12-13 RX ADMIN — PROPOFOL 20 MG: 10 INJECTION, EMULSION INTRAVENOUS at 06:54

## 2024-12-13 RX ADMIN — ONDANSETRON 4 MG: 2 INJECTION INTRAMUSCULAR; INTRAVENOUS at 05:50

## 2024-12-13 RX ADMIN — FERROUS SULFATE TAB 325 MG (65 MG ELEMENTAL FE) 325 MG: 325 (65 FE) TAB at 07:43

## 2024-12-13 RX ADMIN — KETOROLAC TROMETHAMINE 15 MG: 30 INJECTION, SOLUTION INTRAMUSCULAR; INTRAVENOUS at 20:57

## 2024-12-13 NOTE — PLAN OF CARE
Goal Outcome Evaluation:  Plan of Care Reviewed With: patient        Progress: improving  Outcome Evaluation: Ptin recovery from post op PCS, resting, VSS, sig other at bedside.

## 2024-12-13 NOTE — PROGRESS NOTES
Patient has been pushing for greater than 2 hours after she finally went to completely dilated.  The anterior lip resolved.  She has good maternal effort but very little descent.  Could possibly be BOOP.  Certainly not under the pubic bone.  1 dose of ampicillin and gentamicin are ready administered.  Last temperature 100.7.  Discussed arrest of descent, cephalopelvic disproportion, chorioamnionitis with the patient.  Risk of fetal injury by continuing to attempt vaginal birth.  Risks of  discussed with patient and family using a  in the room.  Questions were able to be answered.  Recommend primary low-transverse  section for arrest of descent, CPD, chorioamnionitis.  Patient in agreement.    Tay Day MD

## 2024-12-13 NOTE — PROGRESS NOTES
Just completed a delivery in another room.  This patient had a previous temperature of 102.1 with prolonged rupture of membranes.  Started ampicillin and gentamicin.  Had an anterior lip and has been flipping on the peanut ball.  Finally anterior lip has resolved and we are resuming/beginning second stage of labor.    Tay Day MD

## 2024-12-13 NOTE — ANESTHESIA PROCEDURE NOTES
Spinal Block    Pre-sedation assessment completed: 12/12/2024 7:00 PM    Patient reassessed immediately prior to procedure    Patient location during procedure: OB  Start Time: 12/12/2024 7:10 PM  Stop Time: 12/12/2024 7:20 PM  Indication:at surgeon's request and post-op pain management  Performed By  CRNA/CAA: Elisha Perez CRNA  Preanesthetic Checklist  Completed: patient identified, IV checked, site marked, risks and benefits discussed, surgical consent, monitors and equipment checked, pre-op evaluation and timeout performed  Spinal Block Prep:  Patient Position:sitting  Sterile Tech:cap, gloves, mask and sterile barriers  Prep:Chloraprep and x2  Patient Monitoring:blood pressure monitoring, continuous pulse oximetry and EKG    Spinal Block Procedure  Approach:midline  Guidance:landmark technique and palpation technique  Location:L3-L4  Needle Type:Sprotte  Needle Gauge:25 G  Placement of Spinal needle event:cerebrospinal fluid aspirated, paresthesia and Transient paresthesia with dural puncture. Confirmation of paresthesia disappearance received before medication administered.  Paresthesia: transient  Fluid Appearance:clear     Post Assessment  Patient Tolerance:patient tolerated the procedure well with no apparent complications  Complications no

## 2024-12-13 NOTE — ANESTHESIA PREPROCEDURE EVALUATION
Anesthesia Evaluation     no history of anesthetic complications:   NPO Solid Status: > 6 hours  NPO Liquid Status: < 2 hours           Airway   Mallampati: II  TM distance: >3 FB  Neck ROM: full  No difficulty expected  Dental      Pulmonary - negative pulmonary ROS and normal exam   Cardiovascular - negative cardio ROS and normal exam        Neuro/Psych- negative ROS  GI/Hepatic/Renal/Endo - negative ROS     Musculoskeletal (-) negative ROS    Abdominal    Substance History - negative use     OB/GYN    (+) Pregnant        Other - negative ROS       ROS/Med Hx Other:  used for pre-op interview and spinal/ epidural placement. When originally consulted for placement around 1800, patient stated that she could not sit still in between contractions for epidural placement. Decision was made between patient and nursing to try Nitrous.    1845-Epidural again requested. Patient still writhing in pain. Offered patient low dose spinal to get some pain control with plan to reassess need for epidural afterward. Patient stated she could sit still for spinal placement.      Phys Exam Other: Gravid uterus                  Anesthesia Plan    ASA 2     spinal and epidural     (Patient counseled on risks of epidural/ spinal including bleeding, infection, nerve damage and spinal headache. Patient given opportunity to ask questions. All questions answered and patient agrees to epidural placement.)    Anesthetic plan, risks, benefits, and alternatives have been provided, discussed and informed consent has been obtained with: patient and mother.  Pre-procedure education provided  Use of blood products discussed with patient and mother  Consented to blood products.    Plan discussed with CRNA.        CODE STATUS:    Level Of Support Discussed With: Patient  Code Status (Patient has no pulse and is not breathing): CPR (Attempt to Resuscitate)  Medical Interventions (Patient has pulse or is breathing): Full Support

## 2024-12-13 NOTE — ANESTHESIA POSTPROCEDURE EVALUATION
Patient: Samia Shrestha    Procedure Summary       Date: 24 Room / Location: [facility] LABOR DELIVERY   [facility] LABOR DELIVERY    Anesthesia Start:  Anesthesia Stop: 24    Procedure:  SECTION PRIMARY (Bilateral: Abdomen) Diagnosis: (Cephalopelvic Disproportion)    Surgeons: Tay Day MD Provider: Jadyn Hunter MD    Anesthesia Type: spinal, epidural ASA Status: 2            Anesthesia Type: spinal, epidural    Vitals  Vitals Value Taken Time   /66 24 0918   Temp 98.3 °F (36.8 °C) 24 0718   Pulse 94 24 0918   Resp 16 24 0849   SpO2 99 % 24 0749   Vitals shown include unfiled device data.        Post Anesthesia Care and Evaluation    Patient location during evaluation: bedside  Patient participation: complete - patient participated  Level of consciousness: awake and alert  Pain score: 0  Pain management: adequate    Airway patency: patent  Anesthetic complications: No anesthetic complications  PONV Status: none  Cardiovascular status: acceptable  Respiratory status: acceptable  Hydration status: acceptable  No anesthesia care post op

## 2024-12-13 NOTE — L&D DELIVERY NOTE
[place]   Section Operative Note    Pre-Operative Dx:   1) 24 y.o.   2) IUP at 39 2/7  3) Indications for  section: Arrest of descent, chorioamnionitis         Postoperative dx:    1.  Same     Procedure:  Primary low-transverse  section   Surgeon: Tay Rosario MD and Jefferson Torrez DO co-surgeons.   Assistant: Christopher Sánchez CFA.  Responsible for irrigation, assisting with closing fascia and skin.   Anesthesia: Spinal;Epidural   EBL:   mls.  800  mls.         IV Fluids: 500 mls.   UOP: 200 mls.    No intake/output data recorded.   Antibiotics: ampicillin (Omnipen), metronidazole (Flagyl), and gentamycin (Garamycin)     Infant:      Time of Delivery     Gender: male infant    Weight: 3544 g (7 lb 13 oz)    Apgars: 8  @ 1 minute /     9  @ 5 minutes      Meconium:   Nuchal Cord:    no  no            Indication for C/Section: Arrest of descent, chorioamnionitis                                     Procedure Details:   Once all questions were answered, the patient was given IV antibiotics for ID prophylaxis. Pt was taken to the OR where epidural anesthesia was found to be adequate. A chi catheter was placed and hung to gravity for the duration of the procedure. SCD's were placed on the lower extremities bilaterally for DVT prophylaxis.  A fetal pillow was placed under the fetal head in 180 cc of sterile water was placed in the device.  Once the pt was prepped and draped and anesthesia was found to be adequate, a Pfannensteil skin incision was made on the abdomen and carried down to the fascia. The fascia was incised and extended with Solorio scissors. Kocher clamps were placed on the superior and inferior aspect of the fascia and the rectus abdominal muscles were sharply and bluntly taken down. The rectus abdominal muscles were  in the midline and the periteoneum was entered and bluntly extended. A bladder blade was then inserted and a bladder flap was created. A low  transverse incision was made on the uterus and bluntly extended. Artificial rupture of membranes was performed with clear fluid noted. The fetal head was delivered followed by the rest of the body.  There was a short umbilical cord.  Cord was clamped and cut and baby taken to the warmer. Cord blood was collected and the placenta was delivered. The uterus was exteriorized and cleared of all clots and debris. The uterine incision was repaired in 2 layers. The first layer was a running and locked fashion with 0-Vicryl and the second layer was an imbricating repair with 0-Vicryl suture. The uterus was firm and hemostatic. The abdomen was irrigated and aspirated with warm normal saline. The uterus was placed back into the abdomen. The fascia was reapproximated with 0-vicryl suture. The subcutaneous layer was irrigated and cauterized as needed for hemostasis. The skin was reapproximated with 4-0 vicryl. Pt tolerated the procedure well. Mom and baby are stable and progressing well.      Complications:   None      Disposition:   Mother to Mother Baby/Postpartum  in stable condition currently.   Baby to NBN  in stable condition currently.       Tay Day MD  12/13/2024  07:11 EST     none

## 2024-12-13 NOTE — PROGRESS NOTES
POSTOP POSTPARTUM NOTE  CTSP for abnormal postpartum exam    S:  pt tired and has pain.  Was given 100 micrograms of fentanyl for exam    O:  /66   Pulse 94   Temp 99.2 °F (37.3 °C) (Oral)   Resp 18   LMP 03/13/2024   SpO2 99%   Breastfeeding Unknown     Exam:  fundus firm, 2FBs above the umbilicus, tender    Lochia scant    A:  postop C/S for CPD and chorioamnionitis    P:  add clindymycin, check state cbc, baseline procalcitonin & lactic acid; otherwise routine postop care.    Edward Morelos MD  09:50 EST  12/13/24

## 2024-12-13 NOTE — PLAN OF CARE
Problem: Adult Inpatient Plan of Care  Goal: Plan of Care Review  Outcome: Progressing  Goal: Patient-Specific Goal (Individualized)  Outcome: Progressing  Goal: Absence of Hospital-Acquired Illness or Injury  Outcome: Progressing  Intervention: Identify and Manage Fall Risk  Recent Flowsheet Documentation  Taken 12/13/2024 1532 by Yesica Davis RN  Safety Promotion/Fall Prevention: safety round/check completed  Goal: Optimal Comfort and Wellbeing  Outcome: Progressing  Intervention: Monitor Pain and Promote Comfort  Recent Flowsheet Documentation  Taken 12/13/2024 1532 by Yesica Davis RN  Pain Management Interventions:   pain management plan reviewed with patient/caregiver   medication offered but refused  Taken 12/13/2024 1350 by Yesica Davis RN  Pain Management Interventions: pain medication given  Taken 12/13/2024 0941 by Yesica Davis RN  Pain Management Interventions: pain medication given  Taken 12/13/2024 0849 by Yesica Davis RN  Pain Management Interventions:   pain management plan reviewed with patient/caregiver   no interventions per patient request  Taken 12/13/2024 0749 by Yesica Davis RN  Pain Management Interventions: no interventions per patient request  Taken 12/13/2024 0741 by Yesica Davis RN  Pain Management Interventions: pain medication given  Intervention: Provide Person-Centered Care  Recent Flowsheet Documentation  Taken 12/13/2024 0749 by Yesica Davis RN  Trust Relationship/Rapport:   care explained   choices provided  Taken 12/13/2024 0718 by Yesica Davis RN  Trust Relationship/Rapport:   care explained   choices provided  Goal: Readiness for Transition of Care  Outcome: Progressing   Goal Outcome Evaluation:

## 2024-12-13 NOTE — ANESTHESIA PROCEDURE NOTES
Labor Epidural    Pre-sedation assessment completed: 12/12/2024 7:34 PM    Patient reassessed immediately prior to procedure    Patient location during procedure: OB  Start Time: 12/12/2024 7:38 PM  Stop Time: 12/12/2024 7:45 PM  Performed By  Anesthesiologist: Jadyn Hunter MD  Preanesthetic Checklist  Completed: patient identified, IV checked, site marked, risks and benefits discussed, monitors and equipment checked, pre-op evaluation and timeout performed  Prep:  Pt Position:sitting  Sterile Tech:cap, gloves, mask and sterile barrier  Prep:chlorhexidine gluconate and isopropyl alcohol  Monitoring:blood pressure monitoring, continuous pulse oximetry and EKG (fhts)  Epidural Block Procedure:  Approach:midline  Guidance:landmark technique and palpation technique  Location:L4-L5  Needle Type:Tuohy  Needle Gauge:17 G  Loss of Resistance Medium: saline  Loss of Resistance: 6cm  Catheter at skin depth (cm): threaded 4 cm.  Paresthesia: none  Aspiration:negative  Test Dose:negative  Number of Attempts: 1  Post Assessment:  Dressing:occlusive dressing applied  Pt Tolerance:patient tolerated the procedure well with no apparent complications  Complications:no

## 2024-12-14 LAB
BASOPHILS # BLD AUTO: 0.07 10*3/MM3 (ref 0–0.2)
BASOPHILS NFR BLD AUTO: 0.4 % (ref 0–1.5)
DEPRECATED RDW RBC AUTO: 62.8 FL (ref 37–54)
EOSINOPHIL # BLD AUTO: 0.13 10*3/MM3 (ref 0–0.4)
EOSINOPHIL NFR BLD AUTO: 0.8 % (ref 0.3–6.2)
ERYTHROCYTE [DISTWIDTH] IN BLOOD BY AUTOMATED COUNT: 18.8 % (ref 12.3–15.4)
HCT VFR BLD AUTO: 27.9 % (ref 34–46.6)
HGB BLD-MCNC: 9.3 G/DL (ref 12–15.9)
IMM GRANULOCYTES # BLD AUTO: 0.06 10*3/MM3 (ref 0–0.05)
IMM GRANULOCYTES NFR BLD AUTO: 0.3 % (ref 0–0.5)
LYMPHOCYTES # BLD AUTO: 2.34 10*3/MM3 (ref 0.7–3.1)
LYMPHOCYTES NFR BLD AUTO: 13.6 % (ref 19.6–45.3)
MCH RBC QN AUTO: 30.6 PG (ref 26.6–33)
MCHC RBC AUTO-ENTMCNC: 33.3 G/DL (ref 31.5–35.7)
MCV RBC AUTO: 91.8 FL (ref 79–97)
MONOCYTES # BLD AUTO: 0.32 10*3/MM3 (ref 0.1–0.9)
MONOCYTES NFR BLD AUTO: 1.9 % (ref 5–12)
NEUTROPHILS NFR BLD AUTO: 14.26 10*3/MM3 (ref 1.7–7)
NEUTROPHILS NFR BLD AUTO: 83 % (ref 42.7–76)
NRBC BLD AUTO-RTO: 0 /100 WBC (ref 0–0.2)
PLATELET # BLD AUTO: 223 10*3/MM3 (ref 140–450)
PMV BLD AUTO: 10.4 FL (ref 6–12)
RBC # BLD AUTO: 3.04 10*6/MM3 (ref 3.77–5.28)
WBC NRBC COR # BLD AUTO: 17.18 10*3/MM3 (ref 3.4–10.8)

## 2024-12-14 PROCEDURE — 25010000002 KETOROLAC TROMETHAMINE PER 15 MG: Performed by: OBSTETRICS & GYNECOLOGY

## 2024-12-14 PROCEDURE — 25010000002 GENTAMICIN PER 80 MG: Performed by: OBSTETRICS & GYNECOLOGY

## 2024-12-14 PROCEDURE — 25010000002 CLINDAMYCIN 900 MG/50ML SOLUTION: Performed by: OBSTETRICS & GYNECOLOGY

## 2024-12-14 PROCEDURE — 25010000002 AMPICILLIN PER 500 MG: Performed by: OBSTETRICS & GYNECOLOGY

## 2024-12-14 PROCEDURE — 85025 COMPLETE CBC W/AUTO DIFF WBC: CPT | Performed by: OBSTETRICS & GYNECOLOGY

## 2024-12-14 RX ADMIN — IBUPROFEN 600 MG: 600 TABLET, FILM COATED ORAL at 19:45

## 2024-12-14 RX ADMIN — ACETAMINOPHEN 650 MG: 325 TABLET ORAL at 23:35

## 2024-12-14 RX ADMIN — CLINDAMYCIN PHOSPHATE 900 MG: 900 INJECTION, SOLUTION INTRAVENOUS at 12:42

## 2024-12-14 RX ADMIN — KETOROLAC TROMETHAMINE 15 MG: 30 INJECTION, SOLUTION INTRAMUSCULAR; INTRAVENOUS at 05:34

## 2024-12-14 RX ADMIN — ACETAMINOPHEN 650 MG: 325 TABLET ORAL at 12:42

## 2024-12-14 RX ADMIN — AMPICILLIN SODIUM 2 G: 2 INJECTION, POWDER, FOR SOLUTION INTRAVENOUS at 10:00

## 2024-12-14 RX ADMIN — GENTAMICIN SULFATE 380 MG: 40 INJECTION, SOLUTION INTRAMUSCULAR; INTRAVENOUS at 02:18

## 2024-12-14 RX ADMIN — ACETAMINOPHEN 1000 MG: 500 TABLET ORAL at 05:33

## 2024-12-14 RX ADMIN — PRENATAL VIT W/ FE FUMARATE-FA TAB 27-0.8 MG 1 TABLET: 27-0.8 TAB at 09:39

## 2024-12-14 RX ADMIN — ACETAMINOPHEN 650 MG: 325 TABLET ORAL at 17:24

## 2024-12-14 RX ADMIN — FERROUS SULFATE TAB 325 MG (65 MG ELEMENTAL FE) 325 MG: 325 (65 FE) TAB at 09:39

## 2024-12-14 RX ADMIN — AMPICILLIN SODIUM 2 G: 2 INJECTION, POWDER, FOR SOLUTION INTRAVENOUS at 02:53

## 2024-12-14 RX ADMIN — AMPICILLIN SODIUM 2 G: 2 INJECTION, POWDER, FOR SOLUTION INTRAVENOUS at 17:13

## 2024-12-14 RX ADMIN — KETOROLAC TROMETHAMINE 15 MG: 30 INJECTION, SOLUTION INTRAMUSCULAR; INTRAVENOUS at 12:42

## 2024-12-14 NOTE — PROGRESS NOTES
POSTPARTUM POSTPARTUM NOTE    S:  pt without complaints    O:  BP 98/54 (BP Location: Right arm, Patient Position: Lying)   Pulse 85   Temp 97.8 °F (36.6 °C) (Oral)   Resp 20   LMP 03/13/2024   SpO2 97%   Breastfeeding Unknown     Dressing dry    Lochia scant    Hgb= 9.3 (10.5)    A:  doing well postop C/S w/ chorio    P:  abx dc'd. Routine postpartum care.    Edward Morelos MD  10:07 EST  12/14/24

## 2024-12-14 NOTE — PLAN OF CARE
Goal Outcome Evaluation:   Pt received antibiotics and vital signs stable. Pain controlled with tylenol and toradol. Kerns removed, patient needs help ambulating

## 2024-12-14 NOTE — PLAN OF CARE
Problem: Adult Inpatient Plan of Care  Goal: Plan of Care Review  Outcome: Progressing  Flowsheets (Taken 12/14/2024 1742)  Progress: improving  Outcome Evaluation: vss, pain controlled, bonding well with infant  Plan of Care Reviewed With: patient  Goal: Patient-Specific Goal (Individualized)  Outcome: Progressing  Goal: Absence of Hospital-Acquired Illness or Injury  Outcome: Progressing  Intervention: Identify and Manage Fall Risk  Recent Flowsheet Documentation  Taken 12/14/2024 1717 by Stephani West RN  Safety Promotion/Fall Prevention: safety round/check completed  Taken 12/14/2024 1700 by Stephani West RN  Safety Promotion/Fall Prevention: safety round/check completed  Taken 12/14/2024 1130 by Stephani West RN  Safety Promotion/Fall Prevention: safety round/check completed  Taken 12/14/2024 1000 by Stephani West RN  Safety Promotion/Fall Prevention: safety round/check completed  Taken 12/14/2024 0939 by Stephani West RN  Safety Promotion/Fall Prevention: safety round/check completed  Goal: Optimal Comfort and Wellbeing  Outcome: Progressing  Intervention: Monitor Pain and Promote Comfort  Recent Flowsheet Documentation  Taken 12/14/2024 1724 by Stephani West RN  Pain Management Interventions:   quiet environment facilitated   pain medication given  Intervention: Provide Person-Centered Care  Recent Flowsheet Documentation  Taken 12/14/2024 1700 by Stephani West RN  Trust Relationship/Rapport:   care explained   choices provided   questions encouraged   reassurance provided   thoughts/feelings acknowledged  Taken 12/14/2024 0939 by Stephani West RN  Trust Relationship/Rapport:   care explained   choices provided   thoughts/feelings acknowledged   reassurance provided   empathic listening provided   emotional support provided   questions answered   questions encouraged  Goal: Readiness for Transition of Care  Outcome: Progressing   Goal Outcome Evaluation:  Plan of Care Reviewed With:  patient        Progress: improving  Outcome Evaluation: vss, pain controlled, bonding well with infant

## 2024-12-15 VITALS
RESPIRATION RATE: 17 BRPM | OXYGEN SATURATION: 97 % | TEMPERATURE: 97.7 F | SYSTOLIC BLOOD PRESSURE: 116 MMHG | HEART RATE: 86 BPM | DIASTOLIC BLOOD PRESSURE: 78 MMHG

## 2024-12-15 RX ORDER — OXYCODONE HYDROCHLORIDE 5 MG/1
5 TABLET ORAL EVERY 6 HOURS PRN
Qty: 6 TABLET | Refills: 0 | Status: SHIPPED | OUTPATIENT
Start: 2024-12-15 | End: 2024-12-18

## 2024-12-15 RX ORDER — DOCUSATE SODIUM 100 MG/1
100 CAPSULE, LIQUID FILLED ORAL 2 TIMES DAILY
Qty: 60 CAPSULE | Refills: 1 | Status: SHIPPED | OUTPATIENT
Start: 2024-12-15

## 2024-12-15 RX ORDER — IBUPROFEN 600 MG/1
600 TABLET, FILM COATED ORAL EVERY 6 HOURS
Qty: 30 TABLET | Refills: 0 | Status: SHIPPED | OUTPATIENT
Start: 2024-12-15

## 2024-12-15 RX ADMIN — IBUPROFEN 600 MG: 600 TABLET, FILM COATED ORAL at 02:16

## 2024-12-15 RX ADMIN — OXYCODONE HYDROCHLORIDE 5 MG: 5 TABLET ORAL at 06:04

## 2024-12-15 RX ADMIN — PRENATAL VIT W/ FE FUMARATE-FA TAB 27-0.8 MG 1 TABLET: 27-0.8 TAB at 08:33

## 2024-12-15 RX ADMIN — ACETAMINOPHEN 650 MG: 325 TABLET ORAL at 06:00

## 2024-12-15 RX ADMIN — IBUPROFEN 600 MG: 600 TABLET, FILM COATED ORAL at 08:33

## 2024-12-15 RX ADMIN — FERROUS SULFATE TAB 325 MG (65 MG ELEMENTAL FE) 325 MG: 325 (65 FE) TAB at 08:33

## 2024-12-15 NOTE — DISCHARGE SUMMARY
Date of Discharge:  12/15/2024    Discharge Diagnosis:   S/p Prim C/S, chorio    Problem List:     delivery delivered: 24, male, chorio    Uses Japanese as primary spoken language    Maternal anemia in pregnancy, antepartum    Chorioamnionitis in third trimester      Presenting Problem/History of Present Illness  Pregnancy [Z34.90]    Pt ready for discharge.  Interview conducted with .  Pt without major complaints.  [place] Score <10        ROS:   Review of Systems   Constitutional: Negative.    HENT: Negative.     Eyes: Negative.    Respiratory: Negative.     Cardiovascular: Negative.    Gastrointestinal: Negative.    Endocrine: Negative.    Genitourinary: Negative.    Musculoskeletal: Negative.    Skin: Negative.    Allergic/Immunologic: Negative.    Neurological: Negative.    Hematological: Negative.    Psychiatric/Behavioral: Negative.         Procedures Performed  Procedure(s):   SECTION PRIMARY     , Low Transverse    Consults:   Consults       No orders found from 2024 to 2024.            Pertinent Test Results:   Lab Results (last 24 hours)       ** No results found for the last 24 hours. **            Physical Exam:  Temp:  [97.7 °F (36.5 °C)-98.3 °F (36.8 °C)] 97.7 °F (36.5 °C)  Heart Rate:  [82-96] 86  Resp:  [16-18] 17  BP: (105-116)/(58-78) 116/78    Physical Exam  Vitals and nursing note reviewed.   Constitutional:       Appearance: She is well-developed.   HENT:      Head: Normocephalic and atraumatic.   Pulmonary:      Effort: Pulmonary effort is normal.   Abdominal:      General: Bowel sounds are normal. There is no distension.      Palpations: Abdomen is soft. There is no mass.      Tenderness: There is no abdominal tenderness. There is no guarding or rebound.      Hernia: No hernia is present.      Comments: Incision clean dry and intact   Musculoskeletal:         General: Normal range of motion.      Cervical back: Normal range of motion.  1. Take your placement test  2. Go to your gym and pay your membership  3. Laundry  4. Clean up the house   5. Call sleep medicine to ask about your machine  6.  Look into [employer] and pizza driving positions   Skin:     General: Skin is warm and dry.   Neurological:      Mental Status: She is alert and oriented to person, place, and time.   Psychiatric:         Behavior: Behavior normal.         Thought Content: Thought content normal.         Judgment: Judgment normal.         Discharge Disposition  Home or Self Care    Discharge Medications     Discharge Medications        New Medications        Instructions Start Date   docusate sodium 100 MG capsule  Commonly known as: Colace   100 mg, Oral, 2 Times Daily      ibuprofen 600 MG tablet  Commonly known as: ADVIL,MOTRIN   600 mg, Oral, Every 6 Hours      oxyCODONE 5 MG immediate release tablet  Commonly known as: ROXICODONE   5 mg, Oral, Every 6 Hours PRN             Continue These Medications        Instructions Start Date   ferrous gluconate 324 MG tablet  Commonly known as: FERGON   324 mg, Oral, Daily With Breakfast      PRENATAL PO   Take  by mouth.               Instructions/Activity at Discharge  1. No driving for 2 wks if you've had a , and never drive if you are taking narcotics for pain.  2. Follow up as recommended: 2 weeks for a , 6 weeks for vaginal delivery unless otherwise specified.  3. Ibuprofen is the preferred pain medication.  4. Stool softeners may be prescribed if you have had a .  5. call for temp>101, heavy vaginal bleeding or increasing lower abdominal pain, signs of incisional infection(much redness or much drainage), or swelling/warmth, redness greater in one leg than the other  6. Continue prenatal vits for AT LEAST 6 weeks or as long as you breastfeed.    7. NOTHING IN THE VAGINA for 6 weeks: (no douching, tampons or intercourse)  8. For  sections, no heavy lifting (anything heavier than the baby in the carrier) for 6 weeks.   9. A bath is good for genital pain, showers better for c-sections until cleared by provider.    10. Call for any concerns with postpartum depression; there are many readily available  resources for you now. If you are having suicidal thoughts go to the emergency room as soon as possible!  11. Be considering what you will use for contraception in the future; breastfeeding is NOT contraceptive.    You may call our office 24/7 for urgent needs or questions:  [phone]      Condition on Discharge:  woodrow Morelos MD  09:22 EST  12/15/24

## 2024-12-15 NOTE — PROGRESS NOTES
Patient: Samia Shrestha  Procedure(s):   SECTION PRIMARY  Anesthesia type: spinal, epidural    Patient location: Labor and Delivery  Last vitals:   Vitals:    24 1622   BP: 105/70   Pulse: 83   Resp: 16   Temp: 98.1 °F (36.7 °C)   SpO2: 99%     Level of consciousness: awake, alert, and oriented    Post-anesthesia pain: adequate analgesia  Airway patency: patent  Respiratory: unassisted  Cardiovascular: stable and blood pressure at baseline  Hydration: euvolemic    Anesthetic complications: no

## 2024-12-15 NOTE — NURSING NOTE
Discharge instructions went over with pt using  #[id number] (Omkar). Pt verbalizes understanding and all questions answered at this time. Pt understands importance of follow up care. Discharged home ambulatory with infant. Accompanied by partner and friend.

## 2024-12-17 LAB
LAB AP CASE REPORT: NORMAL
PATH REPORT.FINAL DX SPEC: NORMAL

## 2024-12-17 NOTE — CASE MANAGEMENT/SOCIAL WORK
Case Management Discharge Note      Final Note: dc home         Selected Continued Care - Discharged on 12/15/2024 Admission date: 12/11/2024 - Discharge disposition: Home or Self Care      Destination    No services have been selected for the patient.                Durable Medical Equipment    No services have been selected for the patient.                Dialysis/Infusion    No services have been selected for the patient.                Home Medical Care    No services have been selected for the patient.                Therapy    No services have been selected for the patient.                Community Resources    No services have been selected for the patient.                Community & DME    No services have been selected for the patient.                         Final Discharge Disposition Code: 01 - home or self-care

## 2024-12-24 ENCOUNTER — MATERNAL SCREENING (OUTPATIENT)
Dept: CALL CENTER | Facility: HOSPITAL | Age: 24
End: 2024-12-24
Payer: MEDICAID

## 2024-12-24 NOTE — OUTREACH NOTE
Maternal Screening Survey      Flowsheet Row Responses   Facility patient discharged from? [facility]   Attempt successful? No   Unsuccessful attempts Attempt 2  [[id number]]              Feli GASCA - Registered Nurse

## 2024-12-24 NOTE — OUTREACH NOTE
Maternal Screening Survey      Flowsheet Row Responses   Facility patient discharged from? [facility]   Attempt successful? No   Unsuccessful attempts Attempt 1              Feli GASCA - Registered Nurse

## 2024-12-26 NOTE — OUTREACH NOTE
Maternal Screening Survey      Flowsheet Row Responses   Facility patient discharged from? [facility]   Attempt successful? No   Unsuccessful attempts Attempt 3   Revoke Decline to participate              Feli GASCA - Registered Nurse

## 2024-12-31 ENCOUNTER — POSTPARTUM VISIT (OUTPATIENT)
Dept: OBSTETRICS AND GYNECOLOGY | Facility: CLINIC | Age: 24
End: 2024-12-31
Payer: MEDICAID

## 2024-12-31 VITALS
SYSTOLIC BLOOD PRESSURE: 106 MMHG | HEIGHT: 64 IN | DIASTOLIC BLOOD PRESSURE: 68 MMHG | BODY MASS INDEX: 23.56 KG/M2 | WEIGHT: 138 LBS

## 2024-12-31 DIAGNOSIS — N76.0 BV (BACTERIAL VAGINOSIS): ICD-10-CM

## 2024-12-31 DIAGNOSIS — B96.89 BV (BACTERIAL VAGINOSIS): ICD-10-CM

## 2024-12-31 DIAGNOSIS — Z13.89 SCREENING FOR GENITOURINARY CONDITION: Primary | ICD-10-CM

## 2024-12-31 LAB
BILIRUB BLD-MCNC: NEGATIVE MG/DL
CLARITY, POC: CLEAR
COLOR UR: YELLOW
GLUCOSE UR STRIP-MCNC: NEGATIVE MG/DL
KETONES UR QL: NEGATIVE
LEUKOCYTE EST, POC: NEGATIVE
NITRITE UR-MCNC: NEGATIVE MG/ML
PH UR: 5 [PH] (ref 5–8)
PROT UR STRIP-MCNC: NEGATIVE MG/DL
RBC # UR STRIP: ABNORMAL /UL
SP GR UR: 1 (ref 1–1.03)
UROBILINOGEN UR QL: NORMAL

## 2024-12-31 RX ORDER — METRONIDAZOLE 500 MG/1
500 TABLET ORAL 2 TIMES DAILY
Qty: 14 TABLET | Refills: 0 | Status: SHIPPED | OUTPATIENT
Start: 2024-12-31 | End: 2025-01-07

## 2025-01-16 NOTE — PROGRESS NOTES
"Samia Shrestha is a 24 y.o. patient who presents for follow up of   Chief Complaint   Patient presents with    Postpartum Care       HPI 2 weeks postpartum status post primary  for chorioamnionitis.  Patient doing well.  Pain gone.  Bleeding decreased.  No fevers.  Ambulating voiding without difficulty.  Tolerating p.o. with no nausea or vomiting.  Denies any baby blues.  Breast-feeding without difficulty.  Complains of fishy odor vaginal discharge.  No pelvic pain.    The following portions of the patient's history were reviewed and updated as appropriate: allergies, current medications and problem list.    Review of Systems   Constitutional:  Negative for appetite change, fever and unexpected weight change.   HENT:  Negative for congestion and sore throat.    Respiratory:  Negative for cough and shortness of breath.    Cardiovascular:  Negative for chest pain and palpitations.   Gastrointestinal:  Negative for abdominal distention, abdominal pain, constipation, diarrhea, nausea and vomiting.   Endocrine: Negative.    Genitourinary:  Negative for dyspareunia, menstrual problem, pelvic pain and vaginal discharge.   Skin: Negative.    Neurological:  Negative for dizziness and syncope.   Hematological: Negative.    Psychiatric/Behavioral:  Negative for dysphoric mood and sleep disturbance. The patient is not nervous/anxious.      /68   Ht 162.6 cm (64\")   Wt 62.6 kg (138 lb)   LMP 2024   Breastfeeding Yes   BMI 23.69 kg/m²     Physical Exam  Vitals and nursing note reviewed.   Constitutional:       Appearance: She is well-developed.   HENT:      Head: Normocephalic and atraumatic.   Pulmonary:      Effort: Pulmonary effort is normal.   Abdominal:      General: Bowel sounds are normal. There is no distension.      Palpations: Abdomen is soft. There is no mass.      Tenderness: There is no abdominal tenderness. There is no guarding or rebound.      Comments: C/S incision well healed "   Genitourinary:     Vagina: Normal.   Musculoskeletal:         General: Normal range of motion.   Skin:     General: Skin is warm and dry.   Neurological:      Mental Status: She is alert and oriented to person, place, and time.   Psychiatric:         Behavior: Behavior normal.         Thought Content: Thought content normal.         Judgment: Judgment normal.       Assessment/Plan    Diagnoses and all orders for this visit:    1. Screening for genitourinary condition (Primary)  -     POC Urinalysis Dipstick    2. Postpartum care and examination    3. BV (bacterial vaginosis)    Other orders  -     metroNIDAZOLE (FLAGYL) 500 MG tablet; Take 1 tablet by mouth 2 (Two) Times a Day for 7 days.  Dispense: 14 tablet; Refill: 0    Suspect BV from vaginal prep with Betadine.  Start Flagyl.  New prescription given.    Return in about 1 year (around 12/31/2025) for Annual physical.    I spent 20 minutes caring for Samia on this date of service. This time includes time spent by me in the following activities: preparing for the visit, performing a medically appropriate examination and/or evaluation, counseling and educating the patient/family/caregiver, documenting information in the medical record, and ordering medications.     Tay Day MD  1/16/2025  14:38 EST

## 2025-05-09 ENCOUNTER — APPOINTMENT (OUTPATIENT)
Dept: ULTRASOUND IMAGING | Facility: HOSPITAL | Age: 25
End: 2025-05-09

## 2025-05-09 ENCOUNTER — HOSPITAL ENCOUNTER (EMERGENCY)
Facility: HOSPITAL | Age: 25
Discharge: HOME | End: 2025-05-09
Attending: STUDENT IN AN ORGANIZED HEALTH CARE EDUCATION/TRAINING PROGRAM

## 2025-05-09 VITALS
TEMPERATURE: 98.6 F | HEART RATE: 64 BPM | BODY MASS INDEX: 27.49 KG/M2 | DIASTOLIC BLOOD PRESSURE: 76 MMHG | OXYGEN SATURATION: 100 % | WEIGHT: 161 LBS | RESPIRATION RATE: 18 BRPM | SYSTOLIC BLOOD PRESSURE: 115 MMHG | HEIGHT: 64 IN

## 2025-05-09 DIAGNOSIS — O20.0 THREATENED MISCARRIAGE: Primary | ICD-10-CM

## 2025-05-09 LAB
ALBUMIN SERPL-MCNC: 4 G/DL (ref 3.5–5.2)
ALBUMIN/GLOB SERPL: 1.6 G/DL
ALP SERPL-CCNC: 67 U/L (ref 39–117)
ALT SERPL W P-5'-P-CCNC: 5 U/L (ref 1–33)
ANION GAP SERPL CALCULATED.3IONS-SCNC: 11.3 MMOL/L (ref 5–15)
AST SERPL-CCNC: 17 U/L (ref 1–32)
BACTERIA UR QL AUTO: ABNORMAL /HPF
BASOPHILS # BLD AUTO: 0.02 10*3/MM3 (ref 0–0.2)
BASOPHILS NFR BLD AUTO: 0.2 % (ref 0–1.5)
BILIRUB SERPL-MCNC: 0.3 MG/DL (ref 0–1.2)
BILIRUB UR QL STRIP: NEGATIVE
BUN SERPL-MCNC: 12 MG/DL (ref 6–20)
BUN/CREAT SERPL: 19.4 (ref 7–25)
CALCIUM SPEC-SCNC: 9 MG/DL (ref 8.6–10.5)
CHLORIDE SERPL-SCNC: 104 MMOL/L (ref 98–107)
CLARITY UR: ABNORMAL
CO2 SERPL-SCNC: 21.7 MMOL/L (ref 22–29)
COLOR UR: YELLOW
CREAT SERPL-MCNC: 0.62 MG/DL (ref 0.57–1)
DEPRECATED RDW RBC AUTO: 45.4 FL (ref 37–54)
EGFRCR SERPLBLD CKD-EPI 2021: 129.3 ML/MIN/1.73
EOSINOPHIL # BLD AUTO: 0.02 10*3/MM3 (ref 0–0.4)
EOSINOPHIL NFR BLD AUTO: 0.2 % (ref 0.3–6.2)
ERYTHROCYTE [DISTWIDTH] IN BLOOD BY AUTOMATED COUNT: 14.4 % (ref 12.3–15.4)
GLOBULIN UR ELPH-MCNC: 2.5 GM/DL
GLUCOSE SERPL-MCNC: 99 MG/DL (ref 65–99)
GLUCOSE UR STRIP-MCNC: NEGATIVE MG/DL
HCG INTACT+B SERPL-ACNC: NORMAL MIU/ML
HCT VFR BLD AUTO: 38.3 % (ref 34–46.6)
HGB BLD-MCNC: 13.1 G/DL (ref 12–15.9)
HGB UR QL STRIP.AUTO: ABNORMAL
HYALINE CASTS UR QL AUTO: ABNORMAL /LPF
IMM GRANULOCYTES # BLD AUTO: 0.02 10*3/MM3 (ref 0–0.05)
IMM GRANULOCYTES NFR BLD AUTO: 0.2 % (ref 0–0.5)
KETONES UR QL STRIP: ABNORMAL
LEUKOCYTE ESTERASE UR QL STRIP.AUTO: ABNORMAL
LYMPHOCYTES # BLD AUTO: 2.27 10*3/MM3 (ref 0.7–3.1)
LYMPHOCYTES NFR BLD AUTO: 23.9 % (ref 19.6–45.3)
MCH RBC QN AUTO: 29.8 PG (ref 26.6–33)
MCHC RBC AUTO-ENTMCNC: 34.2 G/DL (ref 31.5–35.7)
MCV RBC AUTO: 87 FL (ref 79–97)
MONOCYTES # BLD AUTO: 0.35 10*3/MM3 (ref 0.1–0.9)
MONOCYTES NFR BLD AUTO: 3.7 % (ref 5–12)
NEUTROPHILS NFR BLD AUTO: 6.8 10*3/MM3 (ref 1.7–7)
NEUTROPHILS NFR BLD AUTO: 71.8 % (ref 42.7–76)
NITRITE UR QL STRIP: NEGATIVE
NRBC BLD AUTO-RTO: 0 /100 WBC (ref 0–0.2)
PH UR STRIP.AUTO: 6 [PH] (ref 4.5–8)
PLATELET # BLD AUTO: 296 10*3/MM3 (ref 140–450)
PMV BLD AUTO: 9.1 FL (ref 6–12)
POTASSIUM SERPL-SCNC: 3.4 MMOL/L (ref 3.5–5.2)
PROT SERPL-MCNC: 6.5 G/DL (ref 6–8.5)
PROT UR QL STRIP: NEGATIVE
RBC # BLD AUTO: 4.4 10*6/MM3 (ref 3.77–5.28)
RBC # UR STRIP: ABNORMAL /HPF
REF LAB TEST METHOD: ABNORMAL
SODIUM SERPL-SCNC: 137 MMOL/L (ref 136–145)
SP GR UR STRIP: 1.01 (ref 1–1.03)
SQUAMOUS #/AREA URNS HPF: ABNORMAL /HPF
UROBILINOGEN UR QL STRIP: ABNORMAL
WBC # UR STRIP: ABNORMAL /HPF
WBC NRBC COR # BLD AUTO: 9.48 10*3/MM3 (ref 3.4–10.8)

## 2025-05-09 PROCEDURE — 99284 EMERGENCY DEPT VISIT MOD MDM: CPT | Performed by: STUDENT IN AN ORGANIZED HEALTH CARE EDUCATION/TRAINING PROGRAM

## 2025-05-09 PROCEDURE — 84702 CHORIONIC GONADOTROPIN TEST: CPT | Performed by: STUDENT IN AN ORGANIZED HEALTH CARE EDUCATION/TRAINING PROGRAM

## 2025-05-09 PROCEDURE — 76817 TRANSVAGINAL US OBSTETRIC: CPT

## 2025-05-09 PROCEDURE — 36415 COLL VENOUS BLD VENIPUNCTURE: CPT

## 2025-05-09 PROCEDURE — 81001 URINALYSIS AUTO W/SCOPE: CPT | Performed by: PHYSICIAN ASSISTANT

## 2025-05-09 PROCEDURE — 80053 COMPREHEN METABOLIC PANEL: CPT | Performed by: PHYSICIAN ASSISTANT

## 2025-05-09 PROCEDURE — 85025 COMPLETE CBC W/AUTO DIFF WBC: CPT | Performed by: PHYSICIAN ASSISTANT

## 2025-05-09 PROCEDURE — 76801 OB US < 14 WKS SINGLE FETUS: CPT

## 2025-05-09 RX ORDER — CEPHALEXIN 500 MG/1
500 CAPSULE ORAL ONCE
Status: COMPLETED | OUTPATIENT
Start: 2025-05-09 | End: 2025-05-09

## 2025-05-09 RX ORDER — PNV NO.95/FERROUS FUM/FOLIC AC 28MG-0.8MG
1 TABLET ORAL DAILY
Qty: 30 TABLET | Refills: 0 | Status: SHIPPED | OUTPATIENT
Start: 2025-05-09 | End: 2025-06-08

## 2025-05-09 RX ORDER — CEPHALEXIN 500 MG/1
500 CAPSULE ORAL 3 TIMES DAILY
Qty: 15 CAPSULE | Refills: 0 | Status: SHIPPED | OUTPATIENT
Start: 2025-05-09 | End: 2025-05-14

## 2025-05-09 RX ADMIN — CEPHALEXIN 500 MG: 500 CAPSULE ORAL at 17:17

## 2025-05-09 NOTE — ED PROVIDER NOTES
Subjective   History of Present Illness  Patient is a 24-year-old female presents the emergency room with concerns for ectopic pregnancy versus miscarriage.  She had a baby about 5 months ago has not started having her period.  She has not breast-feeding.  She started having vaginal bleeding and pain across her abdomen.  She went to urgent care for tingling of her tongue and burning in both of her eyes.  During the workup they found that she was pregnant and sent her to the ER.      Review of Systems   Constitutional: Negative.    HENT:          Tongue tingles bilateral   Eyes:  Positive for itching.        Swelling upper eyelids   Cardiovascular: Negative.    Gastrointestinal: Negative.    Genitourinary:  Positive for vaginal bleeding. Negative for flank pain.        Currently having vaginal bleeding from.  Versus miscarriage versus ectopic   Musculoskeletal: Negative.    Skin: Negative.    Neurological: Negative.    Psychiatric/Behavioral: Negative.     All other systems reviewed and are negative.      History reviewed. No pertinent past medical history.    No Known Allergies    Past Surgical History:   Procedure Laterality Date     SECTION Bilateral 2024    Procedure:  SECTION PRIMARY;  Surgeon: Tay Day MD;  Location: [facility] LABOR DELIVERY;  Service: Obstetrics/Gynecology;  Laterality: Bilateral;       History reviewed. No pertinent family history.    Social History     Socioeconomic History    Marital status:    Tobacco Use    Smoking status: Never     Passive exposure: Never    Smokeless tobacco: Never   Vaping Use    Vaping status: Never Used   Substance and Sexual Activity    Alcohol use: Never    Drug use: Never    Sexual activity: Yes     Partners: Male     Birth control/protection: Birth control pill           Objective   Physical Exam  Vitals and nursing note reviewed.   Constitutional:       Appearance: Normal appearance.   HENT:      Mouth/Throat:       Mouth: Mucous membranes are moist.      Pharynx: No oropharyngeal exudate or posterior oropharyngeal erythema.   Eyes:      Extraocular Movements: Extraocular movements intact.      Conjunctiva/sclera: Conjunctivae normal.      Comments: Upper eyelids both mildly swollen, worse on right   Cardiovascular:      Rate and Rhythm: Normal rate and regular rhythm.   Pulmonary:      Effort: Pulmonary effort is normal.   Abdominal:      General: Bowel sounds are normal. There is no distension.      Palpations: Abdomen is soft.      Tenderness: There is no abdominal tenderness. There is no right CVA tenderness, left CVA tenderness or guarding.   Musculoskeletal:         General: Normal range of motion.      Cervical back: Normal range of motion and neck supple.   Skin:     General: Skin is warm and dry.      Capillary Refill: Capillary refill takes less than 2 seconds.   Neurological:      Mental Status: She is alert.      Gait: Gait normal.   Psychiatric:         Mood and Affect: Mood normal.         Behavior: Behavior normal.         Procedures           ED Course  ED Course as of 05/09/25 1859   Fri May 09, 2025   1534 HCG Quantitative: 132,287.00 [AN]   1629 HCG Quantitative: 132,287.00 [AN]      ED Course User Index  [AN] Ilana Castañeda PA-C                                                       Medical Decision Making  Patient does have a positive pregnancy test with hCG over 132,000 putting her at 7 to 8 weeks.  Ultrasound shows her at 7 weeks 0 days with IUP confirmed.  Did not see evidence of a ectopic pregnancy and by this time 1 should have been seen on the ultrasound.  Patient says her bleeding is light.  She has a positive blood type which I saw in the chart.  She there is bacteria in the urine although it is not a clean catch and I am going to treat her with Keflex.  Give her 1 here and I will start him 3 times a day for 5 days.  Also start on prenatal vitamins for a month.    I sent a referral in for her to see  Dr. Morelos who is the physician who delivered her son 5 months ago.  She knows to call them Monday afternoon if she has not been contacted to set up an appointment.    I talked to patient about returning if bleeding is heavy or she is having hard cramping as it is her safety that we are most concerned about at this time.  She understands that the miscarriage cannot be stopped.  She understands that bleeding does not necessarily mean that she is having a miscarriage and that you can have vaginal bleeding during pregnancy and go along to have a normal full-term baby.    Conversations took place through Vietnamese translation line.    --------------------            05/09/25               1601     --------------------   BP:       118/77     Pulse:      74       Resp:       18       Temp:                SpO2:      100%     --------------------    Labs Reviewed  URINALYSIS W/ MICROSCOPIC IF INDICATED (NO CULTURE) - Abnormal; Notable for the following components:     Appearance, UA                  (*)                  Ketones, UA                     (*)                  Blood, UA                     Large (3+) (*)               Leuk Esterase, UA             Small (1+) (*)            All other components within normal limits  COMPREHENSIVE METABOLIC PANEL - Abnormal; Notable for the following components:     Potassium                     3.4 (*)                CO2                           21.7 (*)            All other components within normal limits         Narrative: GFR Categories in Chronic Kidney Disease (CKD)                                              GFR Category          GFR (mL/min/1.73)    Interpretation                  G1                    90 or greater        Normal or high (1)                  G2                    60-89                Mild decrease (1)                  G3a                   45-59                Mild to moderate decrease                  G3b                    30-44                Moderate to severe decrease                  G4                    15-29                Severe decrease                  G5                    14 or less           Kidney failure                                    (1)In the absence of evidence of kidney disease, neither GFR category G1 or G2 fulfill the criteria for CKD.                                    eGFR calculation 2021 CKD-EPI creatinine equation, which does not include race as a factor  CBC WITH AUTO DIFFERENTIAL - Abnormal; Notable for the following components:     Monocyte %                    3.7 (*)                Eosinophil %                  0.2 (*)             All other components within normal limits  URINALYSIS, MICROSCOPIC ONLY - Abnormal; Notable for the following components:     WBC, UA                       3-5 (*)                Bacteria, UA                  Trace (*)               Squamous Epithelial Cells, UA   21-30 (*)            All other components within normal limits  HCG, QUANTITATIVE, PREGNANCY         Narrative: HCG Ranges by Gestational Age                                    Females - non-pregnant premenopausal   </= 1mIU/mL HCG                  Females - postmenopausal               </= 7mIU/mL HCG                                    3 Weeks       5.4   -      72 mIU/mL                  4 Weeks      10.2   -     708 mIU/mL                  5 Weeks       217   -   8,245 mIU/mL                  6 Weeks       152   -  32,177 mIU/mL                  7 Weeks     4,059   - 153,767 mIU/mL                  8 Weeks    31,366   - 149,094 mIU/mL                  9 Weeks    59,109   - 135,901 mIU/mL                  10 Weeks   44,186   - 170,409 mIU/mL                  12 Weeks   27,107   - 201,615 mIU/mL                  14 Weeks   24,302   -  93,646 mIU/mL                  15 Weeks   12,540   -  69,747 mIU/mL                  16 Weeks    8,904   -  55,332 mIU/mL                  17 Weeks    8,240   -  51,793 mIU/mL                   18 Weeks    9,649   -  55,271 mIU/mL                    CBC AND DIFFERENTIAL    US Ob < 14 Weeks Single or First Gestation  Result Date: 5/9/2025  Impression: Single live intrauterine pregnancy corresponding to 7 weeks gestation with estimated date of delivery on December 26, 2025. Electronically Signed: Constantine Valentin MD  5/9/2025 4:16 PM EDT  Workstation ID: [id number]        Problems Addressed:  Threatened miscarriage: complicated acute illness or injury    Amount and/or Complexity of Data Reviewed  Labs: ordered. Decision-making details documented in ED Course.  Radiology: ordered.    Risk  OTC drugs.  Prescription drug management.        Final diagnoses:   Threatened miscarriage       ED Disposition  ED Disposition       ED Disposition   Discharge    Condition   Stable    Comment   --               Edward Morelos MD  [address] [address] [facility] [address]  [place] [address] [phone]               Medication List        New Prescriptions      cephalexin 500 MG capsule  Commonly known as: KEFLEX  Take 1 capsule by mouth 3 (Three) Times a Day for 5 days.     prenatal vitamin 28-0.8 28-0.8 MG tablet tablet  Take 1 tablet by mouth Daily for 30 days.               Where to Get Your Medications        These medications were sent to [id number] - [place] - [address] - [phone]  - [phone] FX  [address], [place] [address]      Phone: [phone]   cephalexin 500 MG capsule  prenatal vitamin 28-0.8 28-0.8 MG tablet tablet            Ilana Castañeda PA-C  05/09/25 2679

## 2025-05-28 ENCOUNTER — TELEPHONE (OUTPATIENT)
Dept: OBSTETRICS AND GYNECOLOGY | Facility: CLINIC | Age: 25
End: 2025-05-28

## 2025-05-28 ENCOUNTER — OFFICE VISIT (OUTPATIENT)
Dept: OBSTETRICS AND GYNECOLOGY | Facility: CLINIC | Age: 25
End: 2025-05-28
Payer: MEDICAID

## 2025-05-28 VITALS
HEIGHT: 64 IN | DIASTOLIC BLOOD PRESSURE: 76 MMHG | WEIGHT: 138 LBS | SYSTOLIC BLOOD PRESSURE: 112 MMHG | BODY MASS INDEX: 23.56 KG/M2

## 2025-05-28 DIAGNOSIS — Z78.9 USES SPANISH AS PRIMARY SPOKEN LANGUAGE: ICD-10-CM

## 2025-05-28 DIAGNOSIS — Z13.89 SCREENING FOR GENITOURINARY CONDITION: ICD-10-CM

## 2025-05-28 DIAGNOSIS — O09.891 SHORT INTERVAL BETWEEN PREGNANCIES AFFECTING PREGNANCY IN FIRST TRIMESTER, ANTEPARTUM: ICD-10-CM

## 2025-05-28 DIAGNOSIS — Z34.91 PREGNANCY WITH UNCERTAIN DATES IN FIRST TRIMESTER: Primary | ICD-10-CM

## 2025-05-28 PROBLEM — O23.41 URINARY TRACT INFECTION IN MOTHER DURING FIRST TRIMESTER OF PREGNANCY: Status: ACTIVE | Noted: 2025-05-28

## 2025-05-28 PROBLEM — O20.9 BLEEDING IN EARLY PREGNANCY: Status: ACTIVE | Noted: 2025-05-28

## 2025-05-28 PROBLEM — O41.1230 CHORIOAMNIONITIS IN THIRD TRIMESTER: Status: RESOLVED | Noted: 2024-12-13 | Resolved: 2025-05-28

## 2025-05-28 PROBLEM — Z23 NEED FOR VACCINATION: Status: RESOLVED | Noted: 2024-11-27 | Resolved: 2025-05-28

## 2025-05-28 PROBLEM — O99.019 MATERNAL ANEMIA IN PREGNANCY, ANTEPARTUM: Status: RESOLVED | Noted: 2024-10-30 | Resolved: 2025-05-28

## 2025-05-28 RX ORDER — DOXYLAMINE SUCCINATE 25 MG/1
25 TABLET ORAL NIGHTLY PRN
Qty: 30 TABLET | Refills: 1 | Status: SHIPPED | OUTPATIENT
Start: 2025-05-28

## 2025-05-28 RX ORDER — DIPHENHYDRAMINE HYDROCHLORIDE 25 MG/1
25 CAPSULE ORAL NIGHTLY
Qty: 30 TABLET | Refills: 1 | Status: SHIPPED | OUTPATIENT
Start: 2025-05-28

## 2025-05-28 NOTE — PROGRESS NOTES
Confirmation of pregnancy     Chief Complaint   Patient presents with    Amenorrhea     Confirmation of pregnancy         Samia Shrestha is being seen today for evaluation of absence of menses. Due to her period being late, she tested for pregnancy and had + home UPT. She is a 24 y.o. . This problem is new to me, the examiner.       OB History          2    Para   1    Term   1       0    AB   0    Living   1         SAB   0    IAB   0    Ectopic   0    Molar   0    Multiple   0    Live Births   1                LNMP: uncertain   Confident with date: No  Taking prenatal vitamins: Yes. Needs RX: No  Planned pregnancy: No  Prior obstetric issues, potential pregnancy concerns: , PLTCS   Family history of genetic issues (includes FOB): denies   Varicella Hx: has not had   Flu vaccine: S/P   COVID 19 vaccine: declined. Booster vaccine: declined   History of STDs: denies   Current medications: PNV and keflex  Last pap smear:    Smoker: No  Drug or alcohol abuse: No  H/O physical, emotional or sexual abuse:denies  H/O mental health disorder: denies   Prior testing for Cystic Fibrosis Carrier or Sickle Cell Trait- CF/SMA/FX neg with last pregnancy   Prepregnancy BMI - Body mass index is 23.69 kg/m².    No past medical history on file.    Past Surgical History:   Procedure Laterality Date     SECTION Bilateral 2024    Procedure:  SECTION PRIMARY;  Surgeon: Tay Day MD;  Location: [facility] LABOR DELIVERY;  Service: Obstetrics/Gynecology;  Laterality: Bilateral;         Current Outpatient Medications:     Prenatal Vit-Fe Fumarate-FA (prenatal vitamin 28-0.8) 28-0.8 MG tablet tablet, Take 1 tablet by mouth Daily for 30 days., Disp: 30 tablet, Rfl: 0    No Known Allergies    Social History     Socioeconomic History    Marital status:    Tobacco Use    Smoking status: Never     Passive exposure: Never    Smokeless tobacco: Never   Vaping Use    Vaping  "status: Never Used   Substance and Sexual Activity    Alcohol use: Never    Drug use: Never    Sexual activity: Yes     Partners: Male     Birth control/protection: Birth control pill       No family history on file.    Review of systems     Review of Systems   Constitutional:  Positive for fever.   Gastrointestinal:  Positive for nausea.   Genitourinary:  Positive for vaginal bleeding (resolved).       Objective    /76   Ht 162.6 cm (64\")   Wt 62.6 kg (138 lb)   LMP  (LMP Unknown)   BMI 23.69 kg/m²     Physical Exam  Vitals and nursing note reviewed.   Constitutional:       Appearance: Normal appearance.   Musculoskeletal:         General: Normal range of motion.   Skin:     General: Skin is warm and dry.   Neurological:      General: No focal deficit present.      Mental Status: She is alert and oriented to person, place, and time.   Psychiatric:         Mood and Affect: Mood normal.         Behavior: Behavior normal.         Assessment/Plan      Missed menses: + UPT in office. LNMP uncertain. Oriented to practice. US today shows a single, viable IUP @ 10.0 weeks.  bpm. (R) ovary wnl. (L) ovary not seen. EDC 25. EDC established 25 by 7.0 week US prior to this.     Pregnancy: Disc importance of regular prenatal care. Enc PNV daily. Counseled on providers and on call phone. Disc Tylenol products are ok and encouraged no ibuprofen or ASA in pregnancy.  Disc exercise in pregnancy, diet, expected weight gain, etc. Enc no use of tobacco, vaping, drugs, or alcohol during pregnancy. Rev warn s/s of SAB. Safe med list provided.     Labs: Pt counseled on genetic screening, Quad screen, AFP, and NIPS.     Body mass index is 23.69 kg/m².    Smoker- denies     6.   Declined COVID19  vaccine     7.  S/P Flu vaccine     8.  Short interval between pregnancy- PLTCS .     9.   S/P -  PLTCS  for Arrest of descent and chorioamnionitis. Plan RLTCS @ 39 weeks     10.  Bleeding in early " pregnancy- Rh +. S/P ER visit. US there showed a single, viable IUP @ 7 weeks with EDC 12/26/25. Bleeding has resolved.     11.  UTI x 1- Was given Keflex in ER.     12.  Hebrew speaking-  used for visit     13.  Nausea- Enc small frequent meals. ERX B6 and Unisom.       All questions answered.       Encounter Diagnoses   Name Primary?    Screening for genitourinary condition Yes       Diagnoses and all orders for this visit:    Screening for genitourinary condition  -     POC Urinalysis Dipstick  -     POC Pregnancy, Urine        RTO in 2 weeks for new OB exam and labs      Seema Tee, SUAD  5/28/2025  15:12 EDT

## 2025-05-29 PROBLEM — Z34.91 PREGNANCY WITH UNCERTAIN DATES IN FIRST TRIMESTER: Status: ACTIVE | Noted: 2025-05-29

## 2025-06-09 ENCOUNTER — INITIAL PRENATAL (OUTPATIENT)
Dept: OBSTETRICS AND GYNECOLOGY | Facility: CLINIC | Age: 25
End: 2025-06-09
Payer: MEDICAID

## 2025-06-09 VITALS
SYSTOLIC BLOOD PRESSURE: 106 MMHG | DIASTOLIC BLOOD PRESSURE: 62 MMHG | BODY MASS INDEX: 23.02 KG/M2 | WEIGHT: 134.13 LBS

## 2025-06-09 DIAGNOSIS — O09.32 INITIAL OBSTETRIC VISIT IN SECOND TRIMESTER: Primary | ICD-10-CM

## 2025-06-09 DIAGNOSIS — Z36.9 ENCOUNTER FOR ANTENATAL SCREENING, UNSPECIFIED: ICD-10-CM

## 2025-06-09 DIAGNOSIS — R42 DIZZINESS: ICD-10-CM

## 2025-06-09 DIAGNOSIS — Z01.419 CERVICAL SMEAR, AS PART OF ROUTINE GYNECOLOGICAL EXAMINATION: ICD-10-CM

## 2025-06-09 LAB
BILIRUB BLD-MCNC: NEGATIVE MG/DL
CLARITY, POC: CLEAR
COLOR UR: YELLOW
EXTERNAL NIPT: NORMAL
GLUCOSE UR STRIP-MCNC: NEGATIVE MG/DL
KETONES UR QL: NEGATIVE
LEUKOCYTE EST, POC: ABNORMAL
NITRITE UR-MCNC: NEGATIVE MG/ML
PH UR: 5 [PH] (ref 5–8)
PROT UR STRIP-MCNC: ABNORMAL MG/DL
RBC # UR STRIP: NEGATIVE /UL
SP GR UR: 1 (ref 1–1.03)
TSH SERPL DL<=0.005 MIU/L-ACNC: 0.8 UIU/ML (ref 0.27–4.2)
UROBILINOGEN UR QL: ABNORMAL

## 2025-06-09 NOTE — PROGRESS NOTES
Initial ob visit     Chief Complaint   Patient presents with    Initial Prenatal Visit       Samia Shrestha is being seen today for her first obstetrical visit.  She is a 24 y.o.    16w0d gestation. This problem is new to me: no      OB History          2    Para   1    Term   1       0    AB   0    Living   1         SAB   0    IAB   0    Ectopic   0    Molar   0    Multiple   0    Live Births   1                LNMP: uncertain   Confident with date: No  Taking prenatal vitamins: Yes. Needs RX: No  Planned pregnancy: No  Prior obstetric issues, potential pregnancy concerns: , PLTCS   Family history of genetic issues (includes FOB): denies   Varicella Hx: has not had   Flu vaccine: S/P   COVID 19 vaccine: declined. Booster vaccine: declined   History of STDs: denies   Current medications: PNV and keflex  Last pap smear:    Smoker: No  Drug or alcohol abuse: No  H/O physical, emotional or sexual abuse:denies  H/O mental health disorder: denies   Prior testing for Cystic Fibrosis Carrier or Sickle Cell Trait- CF/SMA/FX neg with last pregnancy   Prepregnancy BMI: Body mass index is 23.02 kg/m².      No past medical history on file.    Past Surgical History:   Procedure Laterality Date     SECTION Bilateral 2024    Procedure:  SECTION PRIMARY;  Surgeon: Tay Day MD;  Location: [facility] LABOR DELIVERY;  Service: Obstetrics/Gynecology;  Laterality: Bilateral;         Current Outpatient Medications:     doxylamine (Unisom SleepTabs) 25 MG tablet, Take 1 tablet by mouth At Night As Needed for Nausea., Disp: 30 tablet, Rfl: 1    Prenatal Vit-Fe Fumarate-FA (prenatal vitamin 28-0.8) 28-0.8 MG tablet tablet, Take 1 tablet by mouth Daily for 30 days., Disp: 30 tablet, Rfl: 0    vitamin B-6 (PYRIDOXINE) 25 MG tablet, Take 1 tablet by mouth Every Night., Disp: 30 tablet, Rfl: 1    No Known Allergies    Social History     Socioeconomic History    Marital  status:    Tobacco Use    Smoking status: Never     Passive exposure: Never    Smokeless tobacco: Never   Vaping Use    Vaping status: Never Used   Substance and Sexual Activity    Alcohol use: Never    Drug use: Never    Sexual activity: Yes     Partners: Male     Birth control/protection: Birth control pill       No family history on file.    Review of systems     All other systems reviewed and are negative except for: Cardiovascular: positive for syncope     Objective    /62   Wt 60.8 kg (134 lb 2 oz)   LMP  (LMP Unknown)   BMI 23.02 kg/m²       General Appearance:    Alert, cooperative, in no acute distress, habitus normal    Head:    Not examined   Eyes:           Not examined   Ears:  Not examined       Neck:  No thyroid enlargement or nodules present   Back:     No kyphosis present, no scoliosis present,                       Lungs:     Clear to auscultation,respirations regular, even and                   unlabored    Heart:    Regular rhythm and normal rate, normal S1 and S2, no            murmur, no gallop, no rub, no click   Breast Exam:    No masses, No nipple discharge   Abdomen:     Normal bowel sounds, no masses, no organomegaly, soft        non-tender, non-distended, no guarding, no rebound                 tenderness   Genitalia:    Vulva - No masses, no atrophy, no lesions    Vagina - No discharge, No bleeding    Cervix - No Lesions, closed. Pap collected:Yes     Uterus - Consistent with 16 weeks.     Adnexa - No masses, non tender       Extremities:   Moves all extremities well, no edema, no cyanosis, no              redness       Skin:   No bleeding, bruising or rash       Neurologic:   Sensation intact, A&O times 3      Assessment/Plan    1) Pregnancy at 16w0d-  EDC established 11/24/25 and confirmed by US, LNMP unknown.  +FHTs.     2) OB exam: OB exam completed: Yes. New OB bag provided Yes. Pap collected: Yes     3) Labs: OB labs collected: Yes Counseled on genetic screening:  Yes, she has previously been tested and is negative for CF/SMA/FX. Counseled on Quad screen and AFP: Yes, she desires AFP. Counseled on NIPS: Yes, she desires NIPS.      4)  Prenatal care: Oriented to the office and prenatal care. Encourage prenatal vitamins. Disc Tylenol products are fine, avoid aspirin and ibuprofen; Zika (travel restrictions/ok to use insect repellant); not to change cat litter; food restrictions; exercise;  avoidance of alcohol, tobacco, drugs and saunas/hot tubs.     Smoker- denies      6.   Declined COVID19  vaccine      7.  S/P Flu vaccine      8.  Short interval between pregnancy- PLTCS .      9.   S/P -  PLTCS  for Arrest of descent and chorioamnionitis. Plan RLTCS @ 39 weeks      10.  Bleeding in early pregnancy- Rh +. S/P ER visit. US there showed a single, viable IUP @ 7 weeks with EDC 25. Bleeding has resolved.      11.  UTI x 1- Was given Keflex in ER. Check urine cx today.      12.  Beninese speaking-  used for visit      13.  Nausea- Improving. Taking B6 and Unisom.     14.  Dizziness- Reports occurs daily. Reports drinking adequate H20. Ref to cardiology.  Enc increase H20, slow position changes and add some salt to her diet. Check CBC and TSH today.     All questions answered.     RTO 4 weeks for OB tummy and anatomy     SUAD Graham  2025  11:13 EDT

## 2025-06-11 LAB
ABO GROUP BLD: NORMAL
AFP INTERP SERPL-IMP: NORMAL
AFP INTERP SERPL-IMP: NORMAL
AFP MOM SERPL: 0.2
AFP SERPL-MCNC: 9 NG/ML
AGE AT DELIVERY: 23 YR
AMPHETAMINES UR QL SCN: NEGATIVE NG/ML
BACTERIA UR CULT: NO GROWTH
BACTERIA UR CULT: NORMAL
BARBITURATES UR QL SCN: NEGATIVE NG/ML
BASOPHILS # BLD AUTO: 0 X10E3/UL (ref 0–0.2)
BASOPHILS NFR BLD AUTO: 0 %
BENZODIAZ UR QL SCN: NEGATIVE NG/ML
BLD GP AB SCN SERPL QL: NEGATIVE
BUPRENORPHINE UR QL: NEGATIVE NG/ML
BZE UR QL SCN: NEGATIVE NG/ML
C TRACH RRNA CVX QL NAA+PROBE: NEGATIVE
CANNABINOIDS UR QL SCN: NEGATIVE NG/ML
CONV .: NORMAL
CREAT UR-MCNC: 41.4 MG/DL (ref 20–300)
CYTOLOGIST CVX/VAG CYTO: NORMAL
CYTOLOGY CVX/VAG DOC CYTO: NORMAL
CYTOLOGY CVX/VAG DOC THIN PREP: NORMAL
DX ICD CODE: NORMAL
EOSINOPHIL # BLD AUTO: 0.1 X10E3/UL (ref 0–0.4)
EOSINOPHIL NFR BLD AUTO: 1 %
ERYTHROCYTE [DISTWIDTH] IN BLOOD BY AUTOMATED COUNT: 14.5 % (ref 11.7–15.4)
FENTANYL UR-MCNC: NEGATIVE PG/ML
GA METHOD: NORMAL
GA: 17.7 WEEKS
HBA1C MFR BLD: 5.4 % (ref 4.8–5.6)
HBV SURFACE AG SERPL QL IA: NEGATIVE
HCT VFR BLD AUTO: 40.4 % (ref 34–46.6)
HCV IGG SERPL QL IA: NON REACTIVE
HGB A MFR BLD ELPH: 97.2 % (ref 96.4–98.8)
HGB A2 MFR BLD ELPH: 2.8 % (ref 1.8–3.2)
HGB BLD-MCNC: 13.4 G/DL (ref 11.1–15.9)
HGB F MFR BLD ELPH: 0 % (ref 0–2)
HGB FRACT BLD-IMP: NORMAL
HGB S MFR BLD ELPH: 0 %
HIV 1+2 AB+HIV1 P24 AG SERPL QL IA: NON REACTIVE
IDDM PATIENT QL: NO
IMM GRANULOCYTES # BLD AUTO: 0 X10E3/UL (ref 0–0.1)
IMM GRANULOCYTES NFR BLD AUTO: 0 %
LABORATORY COMMENT REPORT: NORMAL
LABORATORY COMMENT REPORT: NORMAL
LYMPHOCYTES # BLD AUTO: 2.2 X10E3/UL (ref 0.7–3.1)
LYMPHOCYTES NFR BLD AUTO: 31 %
MCH RBC QN AUTO: 29.7 PG (ref 26.6–33)
MCHC RBC AUTO-ENTMCNC: 33.2 G/DL (ref 31.5–35.7)
MCV RBC AUTO: 90 FL (ref 79–97)
MEPERIDINE UR QL: NEGATIVE NG/ML
METHADONE UR QL SCN: NEGATIVE NG/ML
MONOCYTES # BLD AUTO: 0.4 X10E3/UL (ref 0.1–0.9)
MONOCYTES NFR BLD AUTO: 5 %
MULTIPLE PREGNANCY: NO
N GONORRHOEA RRNA CVX QL NAA+PROBE: NEGATIVE
NEURAL TUBE DEFECT RISK FETUS: NORMAL %
NEUTROPHILS # BLD AUTO: 4.4 X10E3/UL (ref 1.4–7)
NEUTROPHILS NFR BLD AUTO: 63 %
OBSERVATION IMP: NORMAL
OPIATES UR QL SCN: NEGATIVE NG/ML
OTHER STN SPEC: NORMAL
OXYCODONE+OXYMORPHONE UR QL SCN: NEGATIVE NG/ML
PCP UR QL: NEGATIVE NG/ML
PH UR: 7.4 [PH] (ref 4.5–8.9)
PLATELET # BLD AUTO: 358 X10E3/UL (ref 150–450)
PROPOXYPH UR QL SCN: NEGATIVE NG/ML
RBC # BLD AUTO: 4.51 X10E6/UL (ref 3.77–5.28)
RESULT: NORMAL
RH BLD: POSITIVE
RPR SER QL: NON REACTIVE
RUBV IGG SERPL IA-ACNC: 3.19 INDEX
SERVICE CMNT-IMP: NORMAL
STAT OF ADQ CVX/VAG CYTO-IMP: NORMAL
T VAGINALIS RRNA SPEC QL NAA+PROBE: NEGATIVE
TRAMADOL UR QL SCN: NEGATIVE NG/ML
VZV IGG SER QL IA: REACTIVE
WBC # BLD AUTO: 7.1 X10E3/UL (ref 3.4–10.8)

## 2025-06-13 LAB
CFDNA.FET/CFDNA.TOTAL SFR FETUS: NORMAL %
CITATION REF LAB TEST: NORMAL
FET 13+18+21+X+Y ANEUP PLAS.CFDNA: NEGATIVE
FET CHR 21 TS PLAS.CFDNA QL: NEGATIVE
FET SEX PLAS.CFDNA DOSAGE CFDNA: NORMAL
FET TS 13 RISK PLAS.CFDNA QL: NEGATIVE
FET TS 18 RISK WBC.DNA+CFDNA QL: NEGATIVE
GA EST FROM CONCEPTION DATE: NORMAL D
GESTATIONAL AGE > 9:: YES
LAB DIRECTOR NAME PROVIDER: NORMAL
LAB DIRECTOR NAME PROVIDER: NORMAL
LABORATORY COMMENT REPORT: NORMAL
LIMITATIONS OF THE TEST: NORMAL
NEGATIVE PREDICTIVE VALUE: NORMAL
PERFORMANCE CHARACTERISTICS: NORMAL
POSITIVE PREDICTIVE VALUE: NORMAL
REF LAB TEST METHOD: NORMAL
SERVICE CMNT-IMP: NORMAL
TEST PERFORMANCE INFO SPEC: NORMAL

## 2025-07-07 ENCOUNTER — ROUTINE PRENATAL (OUTPATIENT)
Dept: OBSTETRICS AND GYNECOLOGY | Facility: CLINIC | Age: 25
End: 2025-07-07

## 2025-07-07 VITALS — SYSTOLIC BLOOD PRESSURE: 108 MMHG | BODY MASS INDEX: 23.34 KG/M2 | WEIGHT: 136 LBS | DIASTOLIC BLOOD PRESSURE: 64 MMHG

## 2025-07-07 DIAGNOSIS — Z34.91 PREGNANCY WITH UNCERTAIN DATES IN FIRST TRIMESTER: ICD-10-CM

## 2025-07-07 DIAGNOSIS — Z36.9 ENCOUNTER FOR ANTENATAL SCREENING, UNSPECIFIED: Primary | ICD-10-CM

## 2025-07-07 DIAGNOSIS — R42 DIZZINESS: ICD-10-CM

## 2025-07-07 DIAGNOSIS — O23.41 URINARY TRACT INFECTION IN MOTHER DURING FIRST TRIMESTER OF PREGNANCY: ICD-10-CM

## 2025-07-07 DIAGNOSIS — Z78.9 USES SPANISH AS PRIMARY SPOKEN LANGUAGE: ICD-10-CM

## 2025-07-07 DIAGNOSIS — Z3A.20 20 WEEKS GESTATION OF PREGNANCY: ICD-10-CM

## 2025-07-07 DIAGNOSIS — O09.891 SHORT INTERVAL BETWEEN PREGNANCIES AFFECTING PREGNANCY IN FIRST TRIMESTER, ANTEPARTUM: ICD-10-CM

## 2025-07-07 DIAGNOSIS — Z71.85 IMMUNIZATION COUNSELING: ICD-10-CM

## 2025-07-07 PROBLEM — Z34.90 PREGNANCY: Status: ACTIVE | Noted: 2025-07-07

## 2025-07-07 PROBLEM — O20.9 BLEEDING IN EARLY PREGNANCY: Status: RESOLVED | Noted: 2025-05-28 | Resolved: 2025-07-07

## 2025-07-07 PROBLEM — O09.32 INITIAL OBSTETRIC VISIT IN SECOND TRIMESTER: Status: RESOLVED | Noted: 2025-06-09 | Resolved: 2025-07-07

## 2025-07-07 NOTE — PROGRESS NOTES
Ob follow up      Samia Shrestha is a 24 y.o.  15w5d patient being seen today for her obstetrical visit. Patient reports no complaints. Fetal movement: Not yet .      ROS - Denies leaking fluid, vaginal bleeding and notes good fetal movement.     /64   Wt 61.7 kg (136 lb)   LMP  (LMP Unknown)   BMI 23.34 kg/m²       Vitals: VSS; AF    General Appearance:  Awake. Alert. Well developed. Well nourished. In no acute distress.    Visual Inspection: ° Abdomen was normal on visual inspection.  Palpation: ° Abdomen was soft. ° Abdominal non-tender.    Uterus: ° Fundal height was normal for gestational age. ° Not tender.  Uterine Adnexae: ° Normal without masses or tenderness.  Neurological:  ° Oriented to time, place, and person.  Skin:  ° General appearance was normal. No bruising or ecchymosis.  Obstetrical: +FM and FCA on monitor     Presentation: VTX  Placenta: Anterior    FCA: 140's  Cervical length: > 6 cm         Ultrasound images and report reviewed personally by me.        Jefferson Torrez, DO     A/P      Diagnoses and all orders for this visit:    1. Encounter for  screening, unspecified (Primary)  -     POC Urinalysis Dipstick    2.  delivery delivered: 24, male, chorio  Overview:  Short interval between pregnancy- PLTCS .      PLTCS  for Arrest of descent and chorioamnionitis. Plan RLTCS @ 39 weeks       3. Short interval between pregnancies affecting pregnancy in first trimester, antepartum    4. Pregnancy with uncertain dates in first trimester    5. Uses Occitan as primary spoken language  Overview:    used for visit       6. Urinary tract infection in mother during first trimester of pregnancy  Overview:  BETHEL  Neg       7. Dizziness  Overview:  Cardiology appt 2025 ( )       8. 20 weeks gestation of pregnancy  Overview:  NIPT and AFP low risk   previously been tested and is negative for CF/SMA/FX      F/u 4.5 weeks for Anatomy ( dates  entered incorrectly first appt)   PIERRE [id number]; discussed with pt       I confirm that all copied/forwarded documentation in this record has been carefully reviewed, updated as necessary, and accurately reflects the patient's current status and plan of care.        Jefferson Torrez DO     7/7/2025  15:45 EDT

## 2025-07-17 ENCOUNTER — OFFICE VISIT (OUTPATIENT)
Dept: CARDIOLOGY | Facility: CLINIC | Age: 25
End: 2025-07-17

## 2025-07-17 VITALS
WEIGHT: 142.3 LBS | HEART RATE: 77 BPM | SYSTOLIC BLOOD PRESSURE: 114 MMHG | OXYGEN SATURATION: 98 % | BODY MASS INDEX: 24.3 KG/M2 | DIASTOLIC BLOOD PRESSURE: 64 MMHG | HEIGHT: 64 IN

## 2025-07-17 DIAGNOSIS — Z3A.17 17 WEEKS GESTATION OF PREGNANCY: ICD-10-CM

## 2025-07-17 DIAGNOSIS — R42 DIZZINESS: Primary | ICD-10-CM

## 2025-07-17 NOTE — PROGRESS NOTES
"        [facility]      Patient Name: Samia Shrestha  :2000  Age: 24 y.o.  Encounter Provider:  Everardo Palomo MD      Chief Complaint:   Chief Complaint   [facility]    [facility]     New patient         HPI  Samia Shrestha is a 24 y.o. female who presents today for evaluation of dizziness.  Patient had her first child in December last year, she quickly got pregnant again and is currently 17 weeks pregnant.  Her first pregnancy was reportedly uncomplicated and she had no cardiovascular symptoms.  During this pregnancy she notes some dizziness where she gets a little lightheaded.  No chest pain or shortness of breath.  Does note some lower extremity edema in her ankles.  She has no known cardiovascular history.  No significant family history of cardiac problems.      The following portions of the patient's history were reviewed and updated as appropriate: allergies, current medications, past family history, past medical history, past social history, past surgical history and problem list.      Previous Cardiac Testing:  None    OBJECTIVE:   Vital Signs  Vitals:    25 1521   BP: 114/64   Pulse: 77   SpO2: 98%     Estimated body mass index is 24.43 kg/m² as calculated from the following:    Height as of this encounter: 162.6 cm (64\").    Weight as of this encounter: 64.5 kg (142 lb 4.8 oz).    Constitutional:       Appearance: Healthy appearance. Not in distress.   Neck:      Vascular: JVD normal.   Pulmonary:      Effort: Pulmonary effort is normal.      Breath sounds: Normal breath sounds. No wheezing. No rhonchi. No rales.   Cardiovascular:      PMI at left midclavicular line. Normal rate. Regular rhythm. Normal S1. Normal S2.       Murmurs: There is no murmur.      No gallop.  No click. No rub.   Pulses:     Intact distal pulses.   Edema:     Peripheral edema absent.   Abdominal:      Palpations: Abdomen is soft.      Tenderness: There is no abdominal tenderness. "   Musculoskeletal: Normal range of motion. Skin:     General: Skin is warm and dry.   Neurological:      General: No focal deficit present.      Mental Status: Alert and oriented to person, place and time.           ECG 12 Lead    Date/Time: 7/17/2025 3:42 PM  Performed by: Everardo Palomo MD    Authorized by: Everardo Palomo MD  Previous ECG: no previous ECG available  Rhythm: sinus rhythm  Rate: normal  Conduction: conduction normal  ST Segments: ST segments normal  T Waves: T waves normal  QRS axis: normal    Clinical impression: normal ECG  Comments: Borderline right axis deviation, likely due to pregnancy               BUN   Date Value Ref Range Status   05/09/2025 12 6 - 20 mg/dL Final     Creatinine   Date Value Ref Range Status   05/09/2025 0.62 0.57 - 1.00 mg/dL Final     Potassium   Date Value Ref Range Status   05/09/2025 3.4 (L) 3.5 - 5.2 mmol/L Final     ALT (SGPT)   Date Value Ref Range Status   05/09/2025 5 1 - 33 U/L Final     AST (SGOT)   Date Value Ref Range Status   05/09/2025 17 1 - 32 U/L Final           ASSESSMENT:      Diagnosis Plan   1. Dizziness  Adult Transthoracic Echo Complete W/ Cont if Necessary Per Protocol    Holter Monitor - 72 Hour Up To 15 Days      2. 17 weeks gestation of pregnancy              PLAN OF CARE:     17 weeks pregnant having dizziness this pregnancy that she did not have previously.  Labs unremarkable.  Blood pressure normal.  EKG normal.  Will obtain an echocardiogram and Holter monitor and if this is normal she can return to clinic as needed.             Discharge Medications            Accurate as of July 17, 2025  3:42 PM. If you have any questions, ask your nurse or doctor.                Continue These Medications        Instructions Start Date   Unisom SleepTabs 25 MG tablet  Generic drug: doxylamine   25 mg, Oral, Nightly PRN      vitamin B-6 25 MG tablet  Commonly known as: PYRIDOXINE   25 mg, Oral, Nightly               Thank you for allowing me to  participate in the care of your patient,      Sincerely,   Everrado Palomo MD  [facility]  07/17/25  15:42 EDT

## 2025-08-11 ENCOUNTER — ROUTINE PRENATAL (OUTPATIENT)
Dept: OBSTETRICS AND GYNECOLOGY | Facility: CLINIC | Age: 25
End: 2025-08-11

## 2025-08-11 VITALS — BODY MASS INDEX: 24.72 KG/M2 | SYSTOLIC BLOOD PRESSURE: 102 MMHG | WEIGHT: 144 LBS | DIASTOLIC BLOOD PRESSURE: 70 MMHG

## 2025-08-11 DIAGNOSIS — Z34.90 PREGNANCY, UNSPECIFIED GESTATIONAL AGE: ICD-10-CM

## 2025-08-11 DIAGNOSIS — Z36.9 ENCOUNTER FOR ANTENATAL SCREENING, UNSPECIFIED: Primary | ICD-10-CM

## 2025-08-11 DIAGNOSIS — R42 DIZZINESS: ICD-10-CM

## 2025-08-11 DIAGNOSIS — O23.41 URINARY TRACT INFECTION IN MOTHER DURING FIRST TRIMESTER OF PREGNANCY: ICD-10-CM

## 2025-08-11 DIAGNOSIS — O09.891 SHORT INTERVAL BETWEEN PREGNANCIES AFFECTING PREGNANCY IN FIRST TRIMESTER, ANTEPARTUM: ICD-10-CM

## 2025-08-11 DIAGNOSIS — Z78.9 USES SPANISH AS PRIMARY SPOKEN LANGUAGE: ICD-10-CM

## 2025-08-11 RX ORDER — PRENATAL VIT/IRON FUM/FOLIC AC 27MG-0.8MG
TABLET ORAL DAILY
COMMUNITY

## 2025-08-14 PROBLEM — O47.02 THREATENED PRETERM LABOR, SECOND TRIMESTER: Status: ACTIVE | Noted: 2025-08-14

## 2025-08-18 ENCOUNTER — ROUTINE PRENATAL (OUTPATIENT)
Dept: OBSTETRICS AND GYNECOLOGY | Facility: CLINIC | Age: 25
End: 2025-08-18

## 2025-08-18 VITALS — DIASTOLIC BLOOD PRESSURE: 68 MMHG | WEIGHT: 147 LBS | SYSTOLIC BLOOD PRESSURE: 100 MMHG | BODY MASS INDEX: 25.23 KG/M2

## 2025-08-18 DIAGNOSIS — Z78.9 USES SPANISH AS PRIMARY SPOKEN LANGUAGE: ICD-10-CM

## 2025-08-18 DIAGNOSIS — Z36.9 ENCOUNTER FOR ANTENATAL SCREENING, UNSPECIFIED: ICD-10-CM

## 2025-08-18 DIAGNOSIS — R42 DIZZINESS: ICD-10-CM

## 2025-08-18 DIAGNOSIS — O09.891 SHORT INTERVAL BETWEEN PREGNANCIES AFFECTING PREGNANCY IN FIRST TRIMESTER, ANTEPARTUM: ICD-10-CM

## 2025-08-18 DIAGNOSIS — Z3A.21 21 WEEKS GESTATION OF PREGNANCY: ICD-10-CM

## 2025-08-18 DIAGNOSIS — O23.41 URINARY TRACT INFECTION IN MOTHER DURING FIRST TRIMESTER OF PREGNANCY: Primary | ICD-10-CM

## 2025-08-18 DIAGNOSIS — N76.0 ACUTE VAGINITIS: ICD-10-CM

## 2025-08-18 PROBLEM — Z34.91 PREGNANCY WITH UNCERTAIN DATES IN FIRST TRIMESTER: Status: RESOLVED | Noted: 2025-05-29 | Resolved: 2025-08-18

## 2025-08-18 PROBLEM — O47.02 THREATENED PRETERM LABOR, SECOND TRIMESTER: Status: RESOLVED | Noted: 2025-08-14 | Resolved: 2025-08-18

## 2025-08-18 LAB
BILIRUB BLD-MCNC: NEGATIVE MG/DL
CLARITY, POC: CLEAR
COLOR UR: NORMAL
GLUCOSE UR STRIP-MCNC: NEGATIVE MG/DL
KETONES UR QL: NEGATIVE
LEUKOCYTE EST, POC: NEGATIVE
NITRITE UR-MCNC: NEGATIVE MG/ML
PH UR: 5 [PH] (ref 5–8)
PROT UR STRIP-MCNC: NEGATIVE MG/DL
RBC # UR STRIP: NEGATIVE /UL
SP GR UR: 1.02 (ref 1–1.03)
UROBILINOGEN UR QL: NORMAL

## 2025-08-21 LAB
A VAGINAE DNA VAG QL NAA+PROBE: ABNORMAL SCORE
BVAB2 DNA VAG QL NAA+PROBE: ABNORMAL SCORE
C ALBICANS DNA VAG QL NAA+PROBE: NEGATIVE
C GLABRATA DNA VAG QL NAA+PROBE: NEGATIVE
C TRACH DNA SPEC QL NAA+PROBE: NEGATIVE
HSV1 DNA SPEC QL NAA+PROBE: NEGATIVE
HSV2 DNA SPEC QL NAA+PROBE: NEGATIVE
M GENITALIUM DNA SPEC QL NAA+PROBE: NEGATIVE
M HOMINIS DNA SPEC QL NAA+PROBE: POSITIVE
MEGA1 DNA VAG QL NAA+PROBE: ABNORMAL SCORE
N GONORRHOEA DNA VAG QL NAA+PROBE: NEGATIVE
T VAGINALIS DNA VAG QL NAA+PROBE: NEGATIVE
UREAPLASMA DNA SPEC QL NAA+PROBE: POSITIVE

## (undated) DEVICE — PREP SOL POVIDONE/IODINE BT 4OZ

## (undated) DEVICE — HYDROGEL COATED LATEX URINE METER FOLEY TRAY,16 FR/CH (5.3 MM), 5 ML CATHETER PRE-CONNECTED TO 2000 ML DRAINAGE BAG WITH NEEDLE SAMPLING: Brand: DOVER

## (undated) DEVICE — DEV BALN C/SECT FETALPILLOW SILCNE

## (undated) DEVICE — PK C/SECT 40

## (undated) DEVICE — TRY SKINPREP DRYPREP

## (undated) DEVICE — SOL IRR H2O BO 1000ML STRL

## (undated) DEVICE — APPL CHLORAPREP HI/LITE 26ML ORNG

## (undated) DEVICE — ANTIBACTERIAL UNDYED BRAIDED (POLYGLACTIN 910), SYNTHETIC ABSORBABLE SUTURE: Brand: COATED VICRYL

## (undated) DEVICE — GLV SURG SENSICARE W/ALOE PF LF 7.5 STRL

## (undated) DEVICE — LINER SURG CANSTR SXN S/RIGD 1500CC

## (undated) DEVICE — SUT VIC 0 CTX 36IN J978H

## (undated) DEVICE — LARGE, DISPOSABLE ALEXIS O C-SECTION PROTECTOR - RETRACTOR: Brand: ALEXIS ® O C-SECTION PROTECTOR - RETRACTOR